# Patient Record
Sex: FEMALE | Race: WHITE | NOT HISPANIC OR LATINO | Employment: FULL TIME | URBAN - METROPOLITAN AREA
[De-identification: names, ages, dates, MRNs, and addresses within clinical notes are randomized per-mention and may not be internally consistent; named-entity substitution may affect disease eponyms.]

---
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
## 2022-01-02 ENCOUNTER — LAB VISIT (OUTPATIENT)
Dept: LAB | Facility: HOSPITAL | Age: 22
End: 2022-01-02
Attending: OBSTETRICS & GYNECOLOGY
Payer: MEDICAID

## 2022-01-02 DIAGNOSIS — Z31.69 INFERTILITY COUNSELING: ICD-10-CM

## 2022-01-02 LAB — PROGEST SERPL-MCNC: 1.9 NG/ML

## 2022-01-02 PROCEDURE — 36415 COLL VENOUS BLD VENIPUNCTURE: CPT | Performed by: OBSTETRICS & GYNECOLOGY

## 2022-01-02 PROCEDURE — 84144 ASSAY OF PROGESTERONE: CPT | Performed by: OBSTETRICS & GYNECOLOGY

## 2022-01-03 ENCOUNTER — PATIENT MESSAGE (OUTPATIENT)
Dept: OBSTETRICS AND GYNECOLOGY | Facility: CLINIC | Age: 22
End: 2022-01-03
Payer: COMMERCIAL

## 2022-01-03 DIAGNOSIS — Z31.9 INFERTILITY MANAGEMENT: Primary | ICD-10-CM

## 2022-01-04 ENCOUNTER — LAB VISIT (OUTPATIENT)
Dept: LAB | Facility: HOSPITAL | Age: 22
End: 2022-01-04
Attending: OBSTETRICS & GYNECOLOGY
Payer: COMMERCIAL

## 2022-01-04 ENCOUNTER — PATIENT MESSAGE (OUTPATIENT)
Dept: OBSTETRICS AND GYNECOLOGY | Facility: CLINIC | Age: 22
End: 2022-01-04
Payer: COMMERCIAL

## 2022-01-04 DIAGNOSIS — Z31.9 INFERTILITY MANAGEMENT: ICD-10-CM

## 2022-01-04 LAB — PROGEST SERPL-MCNC: 5 NG/ML

## 2022-01-04 PROCEDURE — 36415 COLL VENOUS BLD VENIPUNCTURE: CPT | Performed by: OBSTETRICS & GYNECOLOGY

## 2022-01-04 PROCEDURE — 84144 ASSAY OF PROGESTERONE: CPT | Performed by: OBSTETRICS & GYNECOLOGY

## 2022-01-08 ENCOUNTER — PATIENT MESSAGE (OUTPATIENT)
Dept: OBSTETRICS AND GYNECOLOGY | Facility: CLINIC | Age: 22
End: 2022-01-08
Payer: COMMERCIAL

## 2022-01-12 ENCOUNTER — PATIENT MESSAGE (OUTPATIENT)
Dept: OBSTETRICS AND GYNECOLOGY | Facility: CLINIC | Age: 22
End: 2022-01-12
Payer: COMMERCIAL

## 2022-01-14 ENCOUNTER — TELEPHONE (OUTPATIENT)
Dept: OBSTETRICS AND GYNECOLOGY | Facility: CLINIC | Age: 22
End: 2022-01-14
Payer: COMMERCIAL

## 2022-01-14 DIAGNOSIS — N91.2 AMENORRHEA: Primary | ICD-10-CM

## 2022-05-10 ENCOUNTER — OFFICE VISIT (OUTPATIENT)
Dept: OBSTETRICS AND GYNECOLOGY | Facility: CLINIC | Age: 22
End: 2022-05-10
Payer: MEDICAID

## 2022-05-10 VITALS
HEIGHT: 61 IN | BODY MASS INDEX: 48.43 KG/M2 | HEART RATE: 72 BPM | RESPIRATION RATE: 15 BRPM | SYSTOLIC BLOOD PRESSURE: 110 MMHG | WEIGHT: 256.5 LBS | DIASTOLIC BLOOD PRESSURE: 70 MMHG

## 2022-05-10 DIAGNOSIS — N91.2 AMENORRHEA: Primary | ICD-10-CM

## 2022-05-10 DIAGNOSIS — N97.0 ANOVULATORY CYCLE: ICD-10-CM

## 2022-05-10 DIAGNOSIS — Z01.812 PRE-PROCEDURE LAB EXAM: ICD-10-CM

## 2022-05-10 LAB
B-HCG UR QL: NEGATIVE
CTP QC/QA: YES

## 2022-05-10 PROCEDURE — 3008F BODY MASS INDEX DOCD: CPT | Mod: CPTII,,, | Performed by: OBSTETRICS & GYNECOLOGY

## 2022-05-10 PROCEDURE — 3074F SYST BP LT 130 MM HG: CPT | Mod: CPTII,,, | Performed by: OBSTETRICS & GYNECOLOGY

## 2022-05-10 PROCEDURE — 3074F PR MOST RECENT SYSTOLIC BLOOD PRESSURE < 130 MM HG: ICD-10-PCS | Mod: CPTII,,, | Performed by: OBSTETRICS & GYNECOLOGY

## 2022-05-10 PROCEDURE — 99999 PR PBB SHADOW E&M-EST. PATIENT-LVL III: ICD-10-PCS | Mod: PBBFAC,,, | Performed by: OBSTETRICS & GYNECOLOGY

## 2022-05-10 PROCEDURE — 81025 URINE PREGNANCY TEST: CPT | Mod: PBBFAC | Performed by: OBSTETRICS & GYNECOLOGY

## 2022-05-10 PROCEDURE — 1159F PR MEDICATION LIST DOCUMENTED IN MEDICAL RECORD: ICD-10-PCS | Mod: CPTII,,, | Performed by: OBSTETRICS & GYNECOLOGY

## 2022-05-10 PROCEDURE — 3078F DIAST BP <80 MM HG: CPT | Mod: CPTII,,, | Performed by: OBSTETRICS & GYNECOLOGY

## 2022-05-10 PROCEDURE — 99213 PR OFFICE/OUTPT VISIT, EST, LEVL III, 20-29 MIN: ICD-10-PCS | Mod: S$PBB,,, | Performed by: OBSTETRICS & GYNECOLOGY

## 2022-05-10 PROCEDURE — 99213 OFFICE O/P EST LOW 20 MIN: CPT | Mod: S$PBB,,, | Performed by: OBSTETRICS & GYNECOLOGY

## 2022-05-10 PROCEDURE — 99213 OFFICE O/P EST LOW 20 MIN: CPT | Mod: PBBFAC | Performed by: OBSTETRICS & GYNECOLOGY

## 2022-05-10 PROCEDURE — 3078F PR MOST RECENT DIASTOLIC BLOOD PRESSURE < 80 MM HG: ICD-10-PCS | Mod: CPTII,,, | Performed by: OBSTETRICS & GYNECOLOGY

## 2022-05-10 PROCEDURE — 99999 PR PBB SHADOW E&M-EST. PATIENT-LVL III: CPT | Mod: PBBFAC,,, | Performed by: OBSTETRICS & GYNECOLOGY

## 2022-05-10 PROCEDURE — 3008F PR BODY MASS INDEX (BMI) DOCUMENTED: ICD-10-PCS | Mod: CPTII,,, | Performed by: OBSTETRICS & GYNECOLOGY

## 2022-05-10 PROCEDURE — 1159F MED LIST DOCD IN RCRD: CPT | Mod: CPTII,,, | Performed by: OBSTETRICS & GYNECOLOGY

## 2022-05-10 RX ORDER — MEDROXYPROGESTERONE ACETATE 10 MG/1
10 TABLET ORAL DAILY
Qty: 10 TABLET | Refills: 3 | Status: SHIPPED | OUTPATIENT
Start: 2022-05-10 | End: 2022-12-19

## 2022-05-10 NOTE — PROGRESS NOTES
Subjective:    Patient ID: Janett Urias is a 21 y.o. y.o. female.     Chief Complaint:   Chief Complaint   Patient presents with    Fertility       History of Present Illness:  Janett presents today to discuss fertility. She has had multiple progesterones done in the past which have not demonstrated good ovulation. She is currently a few days late on her cycle with negative UPTs. She has seen a fertility specialist who instructed her to lose weight.     Patient had an HSG in 2020 which was normal but has had chlamydia since then.         ROS:   Review of Systems   Constitutional: Negative for activity change, appetite change, chills, diaphoresis, fatigue, fever and unexpected weight change.   HENT: Negative for mouth sores and tinnitus.    Eyes: Negative for discharge and visual disturbance.   Respiratory: Negative for cough, shortness of breath and wheezing.    Cardiovascular: Negative for chest pain, palpitations and leg swelling.   Gastrointestinal: Negative for abdominal pain, bloating, blood in stool, constipation, diarrhea, nausea and vomiting.   Endocrine: Negative for diabetes, hair loss, hot flashes, hyperthyroidism and hypothyroidism.   Genitourinary: Positive for menstrual problem. Negative for dysuria, flank pain, frequency, genital sores, hematuria, urgency, vaginal bleeding, vaginal discharge, vaginal pain, postcoital bleeding and vaginal odor.   Musculoskeletal: Negative for back pain, joint swelling and myalgias.   Integumentary:  Negative for rash, acne, breast mass, nipple discharge and breast skin changes.   Neurological: Negative for seizures, syncope, numbness and headaches.   Hematological: Negative for adenopathy. Does not bruise/bleed easily.   Psychiatric/Behavioral: Negative for depression and sleep disturbance. The patient is not nervous/anxious.    Breast: Negative for mass, mastodynia, nipple discharge and skin changes          Objective:    Vital Signs:  Vitals:    05/10/22 1624    BP: 110/70   Pulse: 72   Resp: 15       Physical Exam:  General:  alert, cooperative, no distress   Head Normocephalic, without obvious abnormality, atraumatic   Neck .supple, symmetrical, trachea midline   Skin:  Skin color, texture, turgor normal. No rashes or lesions   Abdomen:  soft, non-tender; bowel sounds normal   Pelvis: deferred   Extremities extremities normal, atraumatic, no cyanosis or edema   Neurologic Grossly normal      UPT negative    Assessment:      1. Amenorrhea    2. Anovulatory cycle          Plan:      PLAN:   1. Rx of provera  2. Day 21 progesterone, hemoglobin A1c, TSH, Prolactin  3. Repeat HSG   4. Will need Rx of Letrzole for next month

## 2022-05-11 ENCOUNTER — TELEPHONE (OUTPATIENT)
Dept: OBSTETRICS AND GYNECOLOGY | Facility: CLINIC | Age: 22
End: 2022-05-11
Payer: MEDICAID

## 2022-05-11 NOTE — TELEPHONE ENCOUNTER
Pt instructed to contact office on cycle day one of menses, if that happens on the weekend call on Monday.  Pt verbalized understanding.

## 2022-05-11 NOTE — TELEPHONE ENCOUNTER
----- Message from Svetlana Muse MD sent at 5/10/2022  4:39 PM CDT -----  Patient would like HSG scheduled.     Thanks

## 2022-06-01 ENCOUNTER — TELEPHONE (OUTPATIENT)
Dept: OBSTETRICS AND GYNECOLOGY | Facility: CLINIC | Age: 22
End: 2022-06-01
Payer: MEDICAID

## 2022-06-01 DIAGNOSIS — N97.9 INFERTILITY, FEMALE: Primary | ICD-10-CM

## 2022-06-01 DIAGNOSIS — Z01.812 PRE-PROCEDURE LAB EXAM: ICD-10-CM

## 2022-06-01 NOTE — TELEPHONE ENCOUNTER
Menses began on 6/1/2022.  Patient scheduled for HSG with stat urine pregnancy test on 6/9/22.  Instructed to arrive at [facility] main entry at 7:30a.m. registering at the information desk and to be NPO two hours prior to testing.  May take two Ibuprofen or Tylenol one hour prior to procedure if no allergies.  Patient verbalized understanding with no further questions asked.

## 2022-06-01 NOTE — TELEPHONE ENCOUNTER
Called pt and she is cycle day 1 today. Pt desires HSG discussed last visit.. Please place order. Thank you.

## 2022-06-01 NOTE — TELEPHONE ENCOUNTER
Pt called and is cycle day 1 today. Pt desires to schedule HSG. Message sent to Dr. Alejandro to order and pt aware she will receive a call to get that scheduled.

## 2022-06-01 NOTE — TELEPHONE ENCOUNTER
----- Message from Lisa Culp sent at 2022  1:35 PM CDT -----  Contact: self  Janett Urias  MRN: [id number]  : 2000  PCP: Chester Phillips  Home Phone      [phone]  Work Phone      Not on file.  Mobile          [phone]      MESSAGE: Patient stated that she had to call the first day of cycle for a HSG       Phone [phone]

## 2022-08-29 ENCOUNTER — OFFICE VISIT (OUTPATIENT)
Dept: OBSTETRICS AND GYNECOLOGY | Facility: CLINIC | Age: 22
End: 2022-08-29
Attending: OBSTETRICS & GYNECOLOGY
Payer: MEDICAID

## 2022-08-29 ENCOUNTER — PATIENT MESSAGE (OUTPATIENT)
Dept: OBSTETRICS AND GYNECOLOGY | Facility: CLINIC | Age: 22
End: 2022-08-29
Payer: MEDICAID

## 2022-08-29 VITALS
HEIGHT: 61 IN | DIASTOLIC BLOOD PRESSURE: 84 MMHG | HEART RATE: 98 BPM | BODY MASS INDEX: 46.82 KG/M2 | SYSTOLIC BLOOD PRESSURE: 120 MMHG | RESPIRATION RATE: 14 BRPM | WEIGHT: 248 LBS

## 2022-08-29 DIAGNOSIS — N94.9 GENITAL LESION, FEMALE: ICD-10-CM

## 2022-08-29 DIAGNOSIS — Z11.3 SCREEN FOR STD (SEXUALLY TRANSMITTED DISEASE): Primary | ICD-10-CM

## 2022-08-29 PROCEDURE — 87491 CHLMYD TRACH DNA AMP PROBE: CPT | Performed by: OBSTETRICS & GYNECOLOGY

## 2022-08-29 PROCEDURE — 1160F PR REVIEW ALL MEDS BY PRESCRIBER/CLIN PHARMACIST DOCUMENTED: ICD-10-PCS | Mod: CPTII,,, | Performed by: OBSTETRICS & GYNECOLOGY

## 2022-08-29 PROCEDURE — 87529 HSV DNA AMP PROBE: CPT | Performed by: OBSTETRICS & GYNECOLOGY

## 2022-08-29 PROCEDURE — 3008F BODY MASS INDEX DOCD: CPT | Mod: CPTII,,, | Performed by: OBSTETRICS & GYNECOLOGY

## 2022-08-29 PROCEDURE — 1159F PR MEDICATION LIST DOCUMENTED IN MEDICAL RECORD: ICD-10-PCS | Mod: CPTII,,, | Performed by: OBSTETRICS & GYNECOLOGY

## 2022-08-29 PROCEDURE — 3074F SYST BP LT 130 MM HG: CPT | Mod: CPTII,,, | Performed by: OBSTETRICS & GYNECOLOGY

## 2022-08-29 PROCEDURE — 1160F RVW MEDS BY RX/DR IN RCRD: CPT | Mod: CPTII,,, | Performed by: OBSTETRICS & GYNECOLOGY

## 2022-08-29 PROCEDURE — 3079F DIAST BP 80-89 MM HG: CPT | Mod: CPTII,,, | Performed by: OBSTETRICS & GYNECOLOGY

## 2022-08-29 PROCEDURE — 99999 PR PBB SHADOW E&M-EST. PATIENT-LVL III: ICD-10-PCS | Mod: PBBFAC,,, | Performed by: OBSTETRICS & GYNECOLOGY

## 2022-08-29 PROCEDURE — 99213 PR OFFICE/OUTPT VISIT, EST, LEVL III, 20-29 MIN: ICD-10-PCS | Mod: S$PBB,,, | Performed by: OBSTETRICS & GYNECOLOGY

## 2022-08-29 PROCEDURE — 3008F PR BODY MASS INDEX (BMI) DOCUMENTED: ICD-10-PCS | Mod: CPTII,,, | Performed by: OBSTETRICS & GYNECOLOGY

## 2022-08-29 PROCEDURE — 3074F PR MOST RECENT SYSTOLIC BLOOD PRESSURE < 130 MM HG: ICD-10-PCS | Mod: CPTII,,, | Performed by: OBSTETRICS & GYNECOLOGY

## 2022-08-29 PROCEDURE — 99213 OFFICE O/P EST LOW 20 MIN: CPT | Mod: PBBFAC | Performed by: OBSTETRICS & GYNECOLOGY

## 2022-08-29 PROCEDURE — 99999 PR PBB SHADOW E&M-EST. PATIENT-LVL III: CPT | Mod: PBBFAC,,, | Performed by: OBSTETRICS & GYNECOLOGY

## 2022-08-29 PROCEDURE — 99213 OFFICE O/P EST LOW 20 MIN: CPT | Mod: S$PBB,,, | Performed by: OBSTETRICS & GYNECOLOGY

## 2022-08-29 PROCEDURE — 87591 N.GONORRHOEAE DNA AMP PROB: CPT | Performed by: OBSTETRICS & GYNECOLOGY

## 2022-08-29 PROCEDURE — 1159F MED LIST DOCD IN RCRD: CPT | Mod: CPTII,,, | Performed by: OBSTETRICS & GYNECOLOGY

## 2022-08-29 PROCEDURE — 3079F PR MOST RECENT DIASTOLIC BLOOD PRESSURE 80-89 MM HG: ICD-10-PCS | Mod: CPTII,,, | Performed by: OBSTETRICS & GYNECOLOGY

## 2022-08-29 RX ORDER — LIDOCAINE HYDROCHLORIDE 20 MG/ML
JELLY TOPICAL
Qty: 30 ML | Refills: 1 | Status: SHIPPED | OUTPATIENT
Start: 2022-08-29 | End: 2022-12-19

## 2022-08-29 RX ORDER — VALACYCLOVIR HYDROCHLORIDE 1 G/1
1000 TABLET, FILM COATED ORAL EVERY 12 HOURS
Qty: 20 TABLET | Refills: 1 | Status: SHIPPED | OUTPATIENT
Start: 2022-08-29 | End: 2022-09-08

## 2022-08-29 NOTE — PROGRESS NOTES
Subjective:    Patient ID: Janett Urias is a 21 y.o. y.o. female.     Chief Complaint:   Chief Complaint   Patient presents with    Laceration     Pt states she cut herself shaving    STD CHECK       History of Present Illness   Janett presents today complaining of burning and local irritation. Symptoms began 4 days and have been persistent.  Patient's last menstrual period was 08/26/2022. She is sexually active.  She would also like to have GC/CT testing.     ROS:   CONSTITUTIONAL: Negative for fever, chills, diaphoresis, weakness, fatigue, weight loss, weight gain  GASTROINTESTINAL: negative for abdominal pain, flank pain, nausea, vomiting, diarrhea, constipation, black stool, blood in stool  BREAST: negative for breast  tenderness, breast mass, nipple discharge, or skin changes  GENITOURINARY: vulvar pain per HPI, negative for dysuria, frequency/urgency, hematuria, genital discharge, vaginal bleeding, irregular menses, heavy menses, pelvic pain  HEMATOLOGIC/LYMPHATIC: negative for swollen lymph nodes, bleeding, bruising  MUSCULOSKELETAL: negative for back pain, joint pain, joint stiffness, joint swelling, muscle pain, muscle weakness  ENDOCRINE: negative for polydipsia/polyuria, palpitations, skin changes, temperature intolerance, unexpected weight changes      Objective:    Vital Signs:  Vitals:    08/29/22 1436   BP: 120/84   Pulse: 98   Resp: 14       Physical Exam:  General:  alert, cooperative, no distress   Neuro/Psych: AAOx3, appropriate mood and affect   Head: Normocephalic, atraumatic   Neck: Supple, normal ROM   Resp: Normal effort   Skin:  Skin color, texture, turgor normal. No rashes or lesions   Pelvis: External genitalia: normal general appearance, erosions, c/w primary herpes lesion (HSV culture obtained)  Urinary system: urethral meatus normal, bladder nontender  Vaginal: normal mucosa without prolapse or lesions  Cervix: normal appearance  Uterus: normal size, shape, position  Adnexa: normal  size, nontender bilaterally         Assessment:      1. Screen for STD (sexually transmitted disease)    2. Genital lesion, female          Plan:      Screen for STD (sexually transmitted disease)  -     C. trachomatis/N. gonorrhoeae by AMP DNA [facility]; Cervix  -     HSV by Rapid PCR, Non-Blood [facility]; Vagina; Future; Expected date: 08/29/2022  -     valACYclovir (VALTREX) 1000 MG tablet; Take 1 tablet (1,000 mg total) by mouth every 12 (twelve) hours. for 10 days  Dispense: 20 tablet; Refill: 1  -     LIDOcaine HCL 2% (XYLOCAINE) 2 % jelly; Apply to affected area daily prn  Dispense: 30 mL; Refill: 1    Genital lesion, female  -     HSV by Rapid PCR, Non-Blood [facility]; Vagina; Future; Expected date: 08/29/2022  -     valACYclovir (VALTREX) 1000 MG tablet; Take 1 tablet (1,000 mg total) by mouth every 12 (twelve) hours. for 10 days  Dispense: 20 tablet; Refill: 1  -     LIDOcaine HCL 2% (XYLOCAINE) 2 % jelly; Apply to affected area daily prn  Dispense: 30 mL; Refill: 1

## 2022-08-30 LAB
C TRACH DNA SPEC QL NAA+PROBE: DETECTED
N GONORRHOEA DNA SPEC QL NAA+PROBE: NOT DETECTED

## 2022-08-31 ENCOUNTER — TELEPHONE (OUTPATIENT)
Dept: OBSTETRICS AND GYNECOLOGY | Facility: CLINIC | Age: 22
End: 2022-08-31
Payer: MEDICAID

## 2022-08-31 ENCOUNTER — PATIENT MESSAGE (OUTPATIENT)
Dept: OBSTETRICS AND GYNECOLOGY | Facility: CLINIC | Age: 22
End: 2022-08-31
Payer: MEDICAID

## 2022-08-31 RX ORDER — AZITHROMYCIN 500 MG/1
1000 TABLET, FILM COATED ORAL ONCE
Qty: 2 TABLET | Refills: 0 | Status: SHIPPED | OUTPATIENT
Start: 2022-08-31 | End: 2022-08-31

## 2022-08-31 NOTE — TELEPHONE ENCOUNTER
Pt was called with partner information. Informed pt unsure if patient info is in Epic or not. Instructed pt to have partner call back with his information to confirm. Pt voiced understanding.

## 2022-08-31 NOTE — TELEPHONE ENCOUNTER
----- Message from Lisa Culp sent at 2022  2:41 PM CDT -----  Contact: self  Janett Urias  MRN: [id number]  : 2000  PCP: Chester Phillips  Home Phone      [phone]  Work Phone      Not on file.  Mobile          [phone]      MESSAGE: Calling stating she was told to call the clinic to give her partner information.      Phone  [phone]

## 2022-09-02 LAB
HSV1 DNA SPEC QL NAA+PROBE: NEGATIVE
HSV2 DNA SPEC QL NAA+PROBE: POSITIVE
SPECIMEN SOURCE: ABNORMAL

## 2022-09-11 ENCOUNTER — PATIENT MESSAGE (OUTPATIENT)
Dept: OBSTETRICS AND GYNECOLOGY | Facility: CLINIC | Age: 22
End: 2022-09-11
Payer: MEDICAID

## 2022-09-12 RX ORDER — VALACYCLOVIR HYDROCHLORIDE 1 G/1
1000 TABLET, FILM COATED ORAL DAILY
Qty: 30 TABLET | Refills: 0 | Status: SHIPPED | OUTPATIENT
Start: 2022-09-12 | End: 2022-09-30

## 2022-12-14 ENCOUNTER — TELEPHONE (OUTPATIENT)
Dept: OBSTETRICS AND GYNECOLOGY | Facility: CLINIC | Age: 22
End: 2022-12-14
Payer: MEDICAID

## 2022-12-14 NOTE — TELEPHONE ENCOUNTER
Called pt and she has appt in 2 weeks to repeat chlamydia culture due to wanting to make sure it has resolved. Pt denied s/s. Pt desires a sooner appt with any provider. Appt given with Dr. Hernández for tomorrow @ 12. Pt voiced understanding.

## 2022-12-14 NOTE — TELEPHONE ENCOUNTER
----- Message from Karla De Leon sent at 12/14/2022 11:14 AM CST -----  Contact: self  Janett Urias  MRN: [id number]  Home Phone      [phone]  Work Phone      Not on file.  Mobile          [phone]    Patient Care Team:  TRISTA Mckeon as PCP - General (General Practice)  Cheryl Flores MD as Consulting Physician (Obstetrics and Gynecology)  Salazar Hernández MD as Obstetrician (Obstetrics)  OB? No  What phone number can you be reached at? [phone]  Message: patient is wanting to be scheduled for STD screening

## 2022-12-15 ENCOUNTER — OFFICE VISIT (OUTPATIENT)
Dept: OBSTETRICS AND GYNECOLOGY | Facility: CLINIC | Age: 22
End: 2022-12-15
Payer: MEDICAID

## 2022-12-15 ENCOUNTER — LAB VISIT (OUTPATIENT)
Dept: LAB | Facility: HOSPITAL | Age: 22
End: 2022-12-15
Attending: OBSTETRICS & GYNECOLOGY
Payer: MEDICAID

## 2022-12-15 VITALS
WEIGHT: 254 LBS | DIASTOLIC BLOOD PRESSURE: 68 MMHG | HEART RATE: 76 BPM | BODY MASS INDEX: 47.95 KG/M2 | HEIGHT: 61 IN | SYSTOLIC BLOOD PRESSURE: 122 MMHG

## 2022-12-15 DIAGNOSIS — Z11.3 SCREENING EXAMINATION FOR STD (SEXUALLY TRANSMITTED DISEASE): ICD-10-CM

## 2022-12-15 DIAGNOSIS — Z11.3 SCREENING EXAMINATION FOR STD (SEXUALLY TRANSMITTED DISEASE): Primary | ICD-10-CM

## 2022-12-15 PROCEDURE — 99999 PR PBB SHADOW E&M-EST. PATIENT-LVL III: ICD-10-PCS | Mod: PBBFAC,,, | Performed by: OBSTETRICS & GYNECOLOGY

## 2022-12-15 PROCEDURE — 3074F SYST BP LT 130 MM HG: CPT | Mod: CPTII,,, | Performed by: OBSTETRICS & GYNECOLOGY

## 2022-12-15 PROCEDURE — 99213 OFFICE O/P EST LOW 20 MIN: CPT | Mod: PBBFAC | Performed by: OBSTETRICS & GYNECOLOGY

## 2022-12-15 PROCEDURE — 87389 HIV-1 AG W/HIV-1&-2 AB AG IA: CPT | Performed by: OBSTETRICS & GYNECOLOGY

## 2022-12-15 PROCEDURE — 99213 PR OFFICE/OUTPT VISIT, EST, LEVL III, 20-29 MIN: ICD-10-PCS | Mod: S$PBB,,, | Performed by: OBSTETRICS & GYNECOLOGY

## 2022-12-15 PROCEDURE — 36415 COLL VENOUS BLD VENIPUNCTURE: CPT | Performed by: OBSTETRICS & GYNECOLOGY

## 2022-12-15 PROCEDURE — 86592 SYPHILIS TEST NON-TREP QUAL: CPT | Performed by: OBSTETRICS & GYNECOLOGY

## 2022-12-15 PROCEDURE — 3008F PR BODY MASS INDEX (BMI) DOCUMENTED: ICD-10-PCS | Mod: CPTII,,, | Performed by: OBSTETRICS & GYNECOLOGY

## 2022-12-15 PROCEDURE — 1159F MED LIST DOCD IN RCRD: CPT | Mod: CPTII,,, | Performed by: OBSTETRICS & GYNECOLOGY

## 2022-12-15 PROCEDURE — 87591 N.GONORRHOEAE DNA AMP PROB: CPT | Performed by: OBSTETRICS & GYNECOLOGY

## 2022-12-15 PROCEDURE — 3078F DIAST BP <80 MM HG: CPT | Mod: CPTII,,, | Performed by: OBSTETRICS & GYNECOLOGY

## 2022-12-15 PROCEDURE — 3078F PR MOST RECENT DIASTOLIC BLOOD PRESSURE < 80 MM HG: ICD-10-PCS | Mod: CPTII,,, | Performed by: OBSTETRICS & GYNECOLOGY

## 2022-12-15 PROCEDURE — 1160F RVW MEDS BY RX/DR IN RCRD: CPT | Mod: CPTII,,, | Performed by: OBSTETRICS & GYNECOLOGY

## 2022-12-15 PROCEDURE — 1159F PR MEDICATION LIST DOCUMENTED IN MEDICAL RECORD: ICD-10-PCS | Mod: CPTII,,, | Performed by: OBSTETRICS & GYNECOLOGY

## 2022-12-15 PROCEDURE — 1160F PR REVIEW ALL MEDS BY PRESCRIBER/CLIN PHARMACIST DOCUMENTED: ICD-10-PCS | Mod: CPTII,,, | Performed by: OBSTETRICS & GYNECOLOGY

## 2022-12-15 PROCEDURE — 99213 OFFICE O/P EST LOW 20 MIN: CPT | Mod: S$PBB,,, | Performed by: OBSTETRICS & GYNECOLOGY

## 2022-12-15 PROCEDURE — 3074F PR MOST RECENT SYSTOLIC BLOOD PRESSURE < 130 MM HG: ICD-10-PCS | Mod: CPTII,,, | Performed by: OBSTETRICS & GYNECOLOGY

## 2022-12-15 PROCEDURE — 87340 HEPATITIS B SURFACE AG IA: CPT | Performed by: OBSTETRICS & GYNECOLOGY

## 2022-12-15 PROCEDURE — 87491 CHLMYD TRACH DNA AMP PROBE: CPT | Performed by: OBSTETRICS & GYNECOLOGY

## 2022-12-15 PROCEDURE — 99999 PR PBB SHADOW E&M-EST. PATIENT-LVL III: CPT | Mod: PBBFAC,,, | Performed by: OBSTETRICS & GYNECOLOGY

## 2022-12-15 PROCEDURE — 3008F BODY MASS INDEX DOCD: CPT | Mod: CPTII,,, | Performed by: OBSTETRICS & GYNECOLOGY

## 2022-12-15 NOTE — PROGRESS NOTES
Subjective:       Patient ID: Janett Urias is a 22 y.o. female.    Chief Complaint:  repeat culture (Pt here to repeat gc )      History of Present Illness  Patient presents after being treated for chlamydia infection.  Patient reports taking medication as directed.  Patient is also interested in STD screening blood work.  Patient knows that she is positive for herpes.  Patient reports she is been trying to conceive for a while now.    Menstrual History:  OB History          1    Para        Term                AB   1    Living             SAB   1    IAB        Ectopic        Multiple        Live Births                    Menarche age:  Patient's last menstrual period was 2022.         Review of Systems  Review of Systems   Constitutional:  Negative for activity change, appetite change, chills, diaphoresis, fatigue, fever and unexpected weight change.   HENT:  Negative for congestion, dental problem, drooling, ear discharge, ear pain, facial swelling, hearing loss, mouth sores, nosebleeds, postnasal drip, rhinorrhea, sinus pressure, sneezing, sore throat, tinnitus, trouble swallowing and voice change.    Eyes:  Negative for photophobia, pain, discharge, redness, itching and visual disturbance.   Respiratory:  Negative for apnea, cough, choking, chest tightness, shortness of breath, wheezing and stridor.    Cardiovascular:  Negative for chest pain, palpitations and leg swelling.   Gastrointestinal:  Negative for abdominal distention, abdominal pain, anal bleeding, blood in stool, constipation, diarrhea, nausea, rectal pain and vomiting.   Endocrine: Negative for cold intolerance, heat intolerance, polydipsia, polyphagia and polyuria.   Genitourinary:  Negative for decreased urine volume, difficulty urinating, dyspareunia, dysuria, enuresis, flank pain, frequency, genital sores, hematuria, menstrual problem, pelvic pain, urgency, vaginal bleeding, vaginal discharge and vaginal pain.    Musculoskeletal:  Negative for arthralgias, back pain, gait problem, joint swelling, myalgias, neck pain and neck stiffness.   Skin:  Negative for color change, pallor, rash and wound.   Allergic/Immunologic: Negative for environmental allergies, food allergies and immunocompromised state.   Neurological:  Negative for dizziness, tremors, seizures, syncope, facial asymmetry, speech difficulty, weakness, light-headedness, numbness and headaches.   Hematological:  Negative for adenopathy. Does not bruise/bleed easily.   Psychiatric/Behavioral:  Negative for agitation, behavioral problems, confusion, decreased concentration, dysphoric mood, hallucinations, self-injury, sleep disturbance and suicidal ideas. The patient is not nervous/anxious and is not hyperactive.          Objective:      Physical Exam  Vitals and nursing note reviewed. Exam conducted with a chaperone present.   Abdominal:      Hernia: There is no hernia in the left inguinal area or right inguinal area.   Genitourinary:     General: Normal vulva.      Labia:         Right: No rash, tenderness, lesion or injury.         Left: No rash, tenderness, lesion or injury.       Urethra: No prolapse, urethral pain, urethral swelling or urethral lesion.      Vagina: No signs of injury and foreign body. No vaginal discharge, erythema, tenderness or bleeding.      Cervix: No cervical motion tenderness, discharge or friability.      Uterus: Not deviated, not enlarged, not fixed and not tender.       Adnexa:         Right: No mass, tenderness or fullness.          Left: No mass, tenderness or fullness.        Rectum: No external hemorrhoid.   Lymphadenopathy:      Lower Body: No right inguinal adenopathy. No left inguinal adenopathy.         Assessment:        1. Screening examination for STD (sexually transmitted disease)                Plan:         Janett was seen today for repeat culture.    Diagnoses and all orders for this visit:    Screening examination for  STD (sexually transmitted disease)  -     C. trachomatis/N. gonorrhoeae by AMP DNA [facility]; Cervicovaginal  -     Hepatitis B Surface Antigen; Future  -     HIV 1/2 Ag/Ab (4th Gen); Future  -     RPR; Future

## 2022-12-16 LAB
C TRACH DNA SPEC QL NAA+PROBE: NOT DETECTED
N GONORRHOEA DNA SPEC QL NAA+PROBE: NOT DETECTED
RPR SER QL: NORMAL

## 2022-12-17 ENCOUNTER — PATIENT MESSAGE (OUTPATIENT)
Dept: OBSTETRICS AND GYNECOLOGY | Facility: CLINIC | Age: 22
End: 2022-12-17
Payer: MEDICAID

## 2022-12-17 LAB
HBV SURFACE AG SERPL QL IA: NORMAL
HIV 1+2 AB+HIV1 P24 AG SERPL QL IA: NORMAL

## 2022-12-19 ENCOUNTER — PATIENT MESSAGE (OUTPATIENT)
Dept: OBSTETRICS AND GYNECOLOGY | Facility: CLINIC | Age: 22
End: 2022-12-19
Payer: MEDICAID

## 2022-12-19 ENCOUNTER — TELEPHONE (OUTPATIENT)
Dept: OBSTETRICS AND GYNECOLOGY | Facility: CLINIC | Age: 22
End: 2022-12-19
Payer: MEDICAID

## 2022-12-19 DIAGNOSIS — Z31.9 INFERTILITY MANAGEMENT: Primary | ICD-10-CM

## 2022-12-19 RX ORDER — VALACYCLOVIR HYDROCHLORIDE 1 G/1
1000 TABLET, FILM COATED ORAL DAILY
Qty: 30 TABLET | Refills: 1 | Status: SHIPPED | OUTPATIENT
Start: 2022-12-19 | End: 2023-04-10 | Stop reason: SDUPTHER

## 2022-12-19 RX ORDER — CLOMIPHENE CITRATE 50 MG/1
50 TABLET ORAL DAILY
Qty: 5 TABLET | Refills: 0 | Status: SHIPPED | OUTPATIENT
Start: 2022-12-19 | End: 2022-12-24

## 2022-12-19 RX ORDER — MEDROXYPROGESTERONE ACETATE 10 MG/1
10 TABLET ORAL DAILY
Qty: 10 TABLET | Refills: 3 | OUTPATIENT
Start: 2022-12-19 | End: 2022-12-29

## 2022-12-19 NOTE — TELEPHONE ENCOUNTER
----- Message from Karla De Leon sent at 12/19/2022 10:19 AM CST -----  Contact: self  Janett Urias  MRN: [id number]  Home Phone      [phone]  Work Phone      Not on file.  Mobile          [phone]    Patient Care Team:  TRISTA Mckeon as PCP - General (General Practice)  Cheryl Flores MD as Consulting Physician (Obstetrics and Gynecology)  Salazar Hernández MD as Obstetrician (Obstetrics)  OB? No  What phone number can you be reached at? [phone]  Message: patient is wanting to reschedule US and OB on 01/11

## 2022-12-19 NOTE — TELEPHONE ENCOUNTER
Clomid prescription sent to her pharmacy.  She will start this medicine on the 3rd day of her period.  Take daily for 5 days.     I will then want her to have a blood test done on day 21.

## 2022-12-19 NOTE — TELEPHONE ENCOUNTER
----- Message from Karla De Leon sent at 12/19/2022  1:45 PM CST -----  Contact: self  Janett Urias  MRN: [id number]  Home Phone      [phone]  Work Phone      Not on file.  Mobile          [phone]    Patient Care Team:  TRISTA Mckeon as PCP - General (General Practice)  Cheryl Flores MD as Consulting Physician (Obstetrics and Gynecology)  Salazar Hernández MD as Obstetrician (Obstetrics)  OB? No  What phone number can you be reached at? [phone]  Message: patient spoke with  Through the portal and have a few more questions.

## 2022-12-19 NOTE — TELEPHONE ENCOUNTER
Pt states she saw Dr. Hernández last week due to not being able to get in with Dr. Flores. States he discussed with her that if all her test came back nl he would prescribe her Clomid. Pt would like to know if since Dr. Flores is her doctor if she can just be prescribed Clomid by her or if she needs to be seen. Pt would like rx sent to [facility] in [place] if approved. Please advise.

## 2022-12-19 NOTE — TELEPHONE ENCOUNTER
Janett desire refill of Valtrex  Patient Active Problem List   Diagnosis    Chlamydia infection affecting pregnancy    Vaginal bleeding in pregnancy    Spontaneous     Reactive depression    Infertility, female     Prior to Admission medications    Medication Sig Start Date End Date Taking? Authorizing Provider   LIDOcaine HCL 2% (XYLOCAINE) 2 % jelly Apply to affected area daily prn  Patient not taking: Reported on 12/15/2022 8/29/22 8/29/23  Cheryl Flores MD   medroxyPROGESTERone (PROVERA) 10 MG tablet Take 1 tablet (10 mg total) by mouth once daily. for 10 days  Patient not taking: Reported on 2022 5/10/22 5/20/22  Svetlana Muse MD   valACYclovir (VALTREX) 1000 MG tablet TAKE 1 TABLET (1,000 MG TOTAL) BY MOUTH ONCE DAILY. FOR 1 DAY 9/30/22 10/1/22  Cheryl Flores MD

## 2022-12-19 NOTE — TELEPHONE ENCOUNTER
----- Message from Karla De Leon sent at 12/19/2022 10:15 AM CST -----  Contact: self  Janett Urias  MRN: [id number]  Home Phone      [phone]  Work Phone      Not on file.  Mobile          [phone]    Patient Care Team:  TRISTA Mckeon as PCP - General (General Practice)  Cheryl Flores MD as Consulting Physician (Obstetrics and Gynecology)  Salazar Hernández MD as Obstetrician (Obstetrics)  OB? No  What phone number can you be reached at? [phone]  Message: patient wants to follow up after STD screening was clear.

## 2022-12-19 NOTE — TELEPHONE ENCOUNTER
Pt called stating when she was seen for her visit if her std screens came back negative she would be able to begin progesterone and clomid regimen. Pt results are negative, please advise

## 2022-12-19 NOTE — TELEPHONE ENCOUNTER
Pt informed all of her test were negative, pt had messages between Dr. Hernández and Dr. Flores pod and it was becoming confusing so the pt decided to see Dr. Flores for the first round of clomid and progesterone labs and then she will see Dr. Hernández after.

## 2022-12-20 NOTE — TELEPHONE ENCOUNTER
Pt made aware of Rx and instructions.  States she did not start cycle yet.  Pt will contact clinic on CD1 to schedule for CD21 progesterone.  Understanding voiced.

## 2023-01-03 ENCOUNTER — LAB VISIT (OUTPATIENT)
Dept: LAB | Facility: HOSPITAL | Age: 23
End: 2023-01-03
Attending: OBSTETRICS & GYNECOLOGY
Payer: MEDICAID

## 2023-01-03 ENCOUNTER — TELEPHONE (OUTPATIENT)
Dept: OBSTETRICS AND GYNECOLOGY | Facility: CLINIC | Age: 23
End: 2023-01-03
Payer: MEDICAID

## 2023-01-03 DIAGNOSIS — Z34.90 EARLY STAGE OF PREGNANCY: Primary | ICD-10-CM

## 2023-01-03 DIAGNOSIS — Z32.01 POSITIVE URINE PREGNANCY TEST: ICD-10-CM

## 2023-01-03 DIAGNOSIS — Z32.01 POSITIVE URINE PREGNANCY TEST: Primary | ICD-10-CM

## 2023-01-03 LAB — HCG INTACT+B SERPL-ACNC: 76 MIU/ML

## 2023-01-03 PROCEDURE — 36415 COLL VENOUS BLD VENIPUNCTURE: CPT | Performed by: OBSTETRICS & GYNECOLOGY

## 2023-01-03 PROCEDURE — 84702 CHORIONIC GONADOTROPIN TEST: CPT | Performed by: OBSTETRICS & GYNECOLOGY

## 2023-01-03 NOTE — TELEPHONE ENCOUNTER
Pt was called and she had question about HCG being 76. Her LMP was 12/2/22 and would be approximately 4 1/2 weeks. Pt also desires to repeat HCG in 48 hours. Please advise. Thank you.

## 2023-01-03 NOTE — TELEPHONE ENCOUNTER
----- Message from Jerica Capone MA sent at 1/3/2023  8:09 AM CST -----  Contact: mauri Urias  MRN: [id number]  Home Phone      [phone]  Work Phone      Not on file.  Mobile          [phone]    Patient Care Team:  TRISTA Mckeon as PCP - General (General Practice)  Cheryl Flores MD as Consulting Physician (Obstetrics and Gynecology)  Salazar Hernández MD as Obstetrician (Obstetrics)  OB? Yes, Unknown  What phone number can you be reached at? [phone]  Message: Would like to make appt for dx preg and req'ing to see Dr Flores.

## 2023-01-03 NOTE — TELEPHONE ENCOUNTER
Pt was called and informed HCG was ordered and that she can go to the lab. Pt voiced understanding.

## 2023-01-03 NOTE — TELEPHONE ENCOUNTER
Pt was called and informed that HCG was ordered to and that level is great for her approx. EGA. Pt voiced understanding.

## 2023-01-03 NOTE — TELEPHONE ENCOUNTER
Pt was called and she desires appt to dx pregnancy. LMP was 12/2/22 and had positive UPT at home. Pt did not take clomid that was sent on 12/19/22. Appt made for dx pregnancy on 1/9/23 @ 2:45 with Dr. Flores. Pt desires HCG due to hx of SAB in 2019.

## 2023-01-03 NOTE — TELEPHONE ENCOUNTER
----- Message from Jerica Capone MA sent at 1/3/2023 12:28 PM CST -----  Contact: mauri Urias  MRN: [id number]  Home Phone      [phone]  Work Phone      Not on file.  Mobile          [phone]    Patient Care Team:  TRISTA Mckeon as PCP - General (General Practice)  Cheryl Flores MD as Consulting Physician (Obstetrics and Gynecology)  Salazar Hernández MD as Obstetrician (Obstetrics)  OB? yes  What phone number can you be reached at? [phone]  Message: Has questions regarding HGC results.

## 2023-01-05 ENCOUNTER — PATIENT MESSAGE (OUTPATIENT)
Dept: OBSTETRICS AND GYNECOLOGY | Facility: CLINIC | Age: 23
End: 2023-01-05
Payer: MEDICAID

## 2023-01-05 ENCOUNTER — LAB VISIT (OUTPATIENT)
Dept: LAB | Facility: HOSPITAL | Age: 23
End: 2023-01-05
Attending: OBSTETRICS & GYNECOLOGY
Payer: MEDICAID

## 2023-01-05 DIAGNOSIS — Z34.90 EARLY STAGE OF PREGNANCY: ICD-10-CM

## 2023-01-05 LAB — HCG INTACT+B SERPL-ACNC: 205 MIU/ML

## 2023-01-05 PROCEDURE — 36415 COLL VENOUS BLD VENIPUNCTURE: CPT | Performed by: OBSTETRICS & GYNECOLOGY

## 2023-01-05 PROCEDURE — 84702 CHORIONIC GONADOTROPIN TEST: CPT | Performed by: OBSTETRICS & GYNECOLOGY

## 2023-01-09 ENCOUNTER — LAB VISIT (OUTPATIENT)
Dept: LAB | Facility: HOSPITAL | Age: 23
End: 2023-01-09
Attending: OBSTETRICS & GYNECOLOGY
Payer: MEDICAID

## 2023-01-09 ENCOUNTER — OFFICE VISIT (OUTPATIENT)
Dept: OBSTETRICS AND GYNECOLOGY | Facility: CLINIC | Age: 23
End: 2023-01-09
Payer: MEDICAID

## 2023-01-09 VITALS
WEIGHT: 283.19 LBS | HEIGHT: 61 IN | BODY MASS INDEX: 53.47 KG/M2 | DIASTOLIC BLOOD PRESSURE: 76 MMHG | HEART RATE: 98 BPM | SYSTOLIC BLOOD PRESSURE: 126 MMHG | RESPIRATION RATE: 15 BRPM

## 2023-01-09 DIAGNOSIS — Z32.01 PREGNANCY EXAMINATION OR TEST, POSITIVE RESULT: ICD-10-CM

## 2023-01-09 DIAGNOSIS — Z32.01 PREGNANCY EXAMINATION OR TEST, POSITIVE RESULT: Primary | ICD-10-CM

## 2023-01-09 DIAGNOSIS — Z11.3 SCREENING FOR STD (SEXUALLY TRANSMITTED DISEASE): ICD-10-CM

## 2023-01-09 LAB
ABO + RH BLD: NORMAL
B-HCG UR QL: POSITIVE
BASOPHILS # BLD AUTO: 0.02 K/UL (ref 0–0.2)
BASOPHILS NFR BLD: 0.3 % (ref 0–1.9)
BLD GP AB SCN CELLS X3 SERPL QL: NORMAL
CTP QC/QA: YES
DIFFERENTIAL METHOD: ABNORMAL
EOSINOPHIL # BLD AUTO: 0 K/UL (ref 0–0.5)
EOSINOPHIL NFR BLD: 0.1 % (ref 0–8)
ERYTHROCYTE [DISTWIDTH] IN BLOOD BY AUTOMATED COUNT: 11.9 % (ref 11.5–14.5)
HCT VFR BLD AUTO: 37.7 % (ref 37–48.5)
HGB BLD-MCNC: 13.2 G/DL (ref 12–16)
IMM GRANULOCYTES # BLD AUTO: 0.03 K/UL (ref 0–0.04)
IMM GRANULOCYTES NFR BLD AUTO: 0.4 % (ref 0–0.5)
LYMPHOCYTES # BLD AUTO: 2.4 K/UL (ref 1–4.8)
LYMPHOCYTES NFR BLD: 33 % (ref 18–48)
MCH RBC QN AUTO: 32.6 PG (ref 27–31)
MCHC RBC AUTO-ENTMCNC: 35 G/DL (ref 32–36)
MCV RBC AUTO: 93 FL (ref 82–98)
MONOCYTES # BLD AUTO: 0.7 K/UL (ref 0.3–1)
MONOCYTES NFR BLD: 8.9 % (ref 4–15)
NEUTROPHILS # BLD AUTO: 4.2 K/UL (ref 1.8–7.7)
NEUTROPHILS NFR BLD: 57.3 % (ref 38–73)
NRBC BLD-RTO: 0 /100 WBC
PLATELET # BLD AUTO: 247 K/UL (ref 150–450)
PMV BLD AUTO: 10.3 FL (ref 9.2–12.9)
RBC # BLD AUTO: 4.05 M/UL (ref 4–5.4)
WBC # BLD AUTO: 7.28 K/UL (ref 3.9–12.7)

## 2023-01-09 PROCEDURE — 85025 COMPLETE CBC W/AUTO DIFF WBC: CPT | Performed by: OBSTETRICS & GYNECOLOGY

## 2023-01-09 PROCEDURE — 3008F PR BODY MASS INDEX (BMI) DOCUMENTED: ICD-10-PCS | Mod: CPTII,,, | Performed by: OBSTETRICS & GYNECOLOGY

## 2023-01-09 PROCEDURE — 3074F PR MOST RECENT SYSTOLIC BLOOD PRESSURE < 130 MM HG: ICD-10-PCS | Mod: CPTII,,, | Performed by: OBSTETRICS & GYNECOLOGY

## 2023-01-09 PROCEDURE — 1159F MED LIST DOCD IN RCRD: CPT | Mod: CPTII,,, | Performed by: OBSTETRICS & GYNECOLOGY

## 2023-01-09 PROCEDURE — 3008F BODY MASS INDEX DOCD: CPT | Mod: CPTII,,, | Performed by: OBSTETRICS & GYNECOLOGY

## 2023-01-09 PROCEDURE — 1160F PR REVIEW ALL MEDS BY PRESCRIBER/CLIN PHARMACIST DOCUMENTED: ICD-10-PCS | Mod: CPTII,,, | Performed by: OBSTETRICS & GYNECOLOGY

## 2023-01-09 PROCEDURE — 1159F PR MEDICATION LIST DOCUMENTED IN MEDICAL RECORD: ICD-10-PCS | Mod: CPTII,,, | Performed by: OBSTETRICS & GYNECOLOGY

## 2023-01-09 PROCEDURE — 1160F RVW MEDS BY RX/DR IN RCRD: CPT | Mod: CPTII,,, | Performed by: OBSTETRICS & GYNECOLOGY

## 2023-01-09 PROCEDURE — 86762 RUBELLA ANTIBODY: CPT | Performed by: OBSTETRICS & GYNECOLOGY

## 2023-01-09 PROCEDURE — 87077 CULTURE AEROBIC IDENTIFY: CPT | Performed by: OBSTETRICS & GYNECOLOGY

## 2023-01-09 PROCEDURE — 81025 URINE PREGNANCY TEST: CPT | Mod: PBBFAC | Performed by: OBSTETRICS & GYNECOLOGY

## 2023-01-09 PROCEDURE — 86803 HEPATITIS C AB TEST: CPT | Performed by: OBSTETRICS & GYNECOLOGY

## 2023-01-09 PROCEDURE — 87088 URINE BACTERIA CULTURE: CPT | Performed by: OBSTETRICS & GYNECOLOGY

## 2023-01-09 PROCEDURE — 87186 SC STD MICRODIL/AGAR DIL: CPT | Performed by: OBSTETRICS & GYNECOLOGY

## 2023-01-09 PROCEDURE — 3078F DIAST BP <80 MM HG: CPT | Mod: CPTII,,, | Performed by: OBSTETRICS & GYNECOLOGY

## 2023-01-09 PROCEDURE — 99999 PR PBB SHADOW E&M-EST. PATIENT-LVL III: CPT | Mod: PBBFAC,,, | Performed by: OBSTETRICS & GYNECOLOGY

## 2023-01-09 PROCEDURE — 87086 URINE CULTURE/COLONY COUNT: CPT | Performed by: OBSTETRICS & GYNECOLOGY

## 2023-01-09 PROCEDURE — 99213 OFFICE O/P EST LOW 20 MIN: CPT | Mod: PBBFAC,TH | Performed by: OBSTETRICS & GYNECOLOGY

## 2023-01-09 PROCEDURE — 99204 PR OFFICE/OUTPT VISIT, NEW, LEVL IV, 45-59 MIN: ICD-10-PCS | Mod: S$PBB,TH,, | Performed by: OBSTETRICS & GYNECOLOGY

## 2023-01-09 PROCEDURE — 99999 PR PBB SHADOW E&M-EST. PATIENT-LVL III: ICD-10-PCS | Mod: PBBFAC,,, | Performed by: OBSTETRICS & GYNECOLOGY

## 2023-01-09 PROCEDURE — 3074F SYST BP LT 130 MM HG: CPT | Mod: CPTII,,, | Performed by: OBSTETRICS & GYNECOLOGY

## 2023-01-09 PROCEDURE — 99204 OFFICE O/P NEW MOD 45 MIN: CPT | Mod: S$PBB,TH,, | Performed by: OBSTETRICS & GYNECOLOGY

## 2023-01-09 PROCEDURE — 86900 BLOOD TYPING SEROLOGIC ABO: CPT | Performed by: OBSTETRICS & GYNECOLOGY

## 2023-01-09 PROCEDURE — 36415 COLL VENOUS BLD VENIPUNCTURE: CPT | Performed by: OBSTETRICS & GYNECOLOGY

## 2023-01-09 PROCEDURE — 3078F PR MOST RECENT DIASTOLIC BLOOD PRESSURE < 80 MM HG: ICD-10-PCS | Mod: CPTII,,, | Performed by: OBSTETRICS & GYNECOLOGY

## 2023-01-09 NOTE — PROGRESS NOTES
Subjective:   Patient ID: Janett Urias is a 22 y.o. y.o. female.     Chief Complaint: Missed Menses       History of Present Illness:    Janett presents today complaining of amenorrhea. Patient's last menstrual period was 2022 (exact date).. Prior menstrual cycles have been regular. She reports breast tenderness, fatigue, frequent urination, morning sickness, nausea, and positive home pregnancy test. UPT is positive.       Past Medical History:   Diagnosis Date    Chlamydia      History reviewed. No pertinent surgical history.  Social History     Socioeconomic History    Marital status: Single   Tobacco Use    Smoking status: Never    Smokeless tobacco: Never   Substance and Sexual Activity    Alcohol use: No    Drug use: No    Sexual activity: Yes     Partners: Male     Birth control/protection: None     Comment: single     Family History   Problem Relation Age of Onset    Stroke Paternal Grandfather     Hypertension Paternal Grandmother     Stroke Maternal Grandfather     No Known Problems Father     Diabetes Mother     Hypertension Mother     No Known Problems Brother     Diabetes Sister     Breast cancer Neg Hx     Colon cancer Neg Hx     Ovarian cancer Neg Hx      OB History    Para Term  AB Living   2       1     SAB IAB Ectopic Multiple Live Births   1              # Outcome Date GA Lbr Khang/2nd Weight Sex Delivery Anes PTL Lv   2             1 SAB 2019 11w2d    SAB            ROS:   Review of Systems   Constitutional:  Positive for fatigue. Negative for chills, diaphoresis, fever and unexpected weight change.   HENT:  Negative for congestion, hearing loss, rhinorrhea and sore throat.    Eyes:  Negative for pain, discharge and visual disturbance.   Respiratory:  Negative for apnea, cough, shortness of breath and wheezing.    Cardiovascular:  Negative for chest pain, palpitations and leg swelling.   Gastrointestinal:  Positive for nausea. Negative for abdominal pain, constipation,  diarrhea and vomiting.   Endocrine: Negative for cold intolerance and heat intolerance.   Genitourinary:  Positive for frequency. Negative for difficulty urinating, dyspareunia, dysuria, flank pain, genital sores, hematuria, menstrual problem, pelvic pain, vaginal bleeding, vaginal discharge and vaginal pain.   Musculoskeletal:  Negative for arthralgias, back pain and joint swelling.   Skin:  Negative for rash.   Neurological:  Negative for dizziness, weakness, light-headedness, numbness and headaches.   Psychiatric/Behavioral:  Negative for agitation and confusion. The patient is not nervous/anxious.          Objective:   Vital Signs:  Vitals:    01/09/23 1451   BP: 126/76   Pulse: 98   Resp: 15       Physical Exam:  General:  alert, cooperative, no distress   Head: Normocephalic, atraumatic   Neck: Supple, Normal ROM   Respiratory: Normal effort   Neuro/Psych: Alert and oriented, appropriate mood and affect   Abdomen:   Soft, NT   Pelvis: External genitalia: normal general appearance  Urinary system: urethral meatus normal, bladder nontender  Vaginal: normal mucosa without prolapse or lesions  Cervix: normal appearance  Uterus: normal size, shape, position  Adnexa: normal size, nontender bilaterally   Ext: No edema       Assessment:      1. Pregnancy examination or test, positive result    2. Screening for STD (sexually transmitted disease)          Plan:        Pregnancy examination or test, positive result  -     Urine culture  -     POCT urine pregnancy  -     Rubella Antibody, IgG; Future; Expected date: 01/09/2023  -     US OB/GYN Procedure ([facility]) - Extended List; Future; Expected date: 01/23/2023  -     Type & Screen - Ob Profile; Future; Expected date: 01/09/2023  -     CBC Auto Differential; Future; Expected date: 01/09/2023  -     HEPATITIS C ANTIBODY; Future; Expected date: 01/09/2023    Screening for STD (sexually transmitted disease)        1. Pap not indicated  2. Prenatal labs ordered and GC/CT  performed. Pt already had some STD testing 3 weeks ago  3. Schedule Ultrasound  4. Follow up in 4 weeks after ultrasound.    Patient was counseled today on proper weight gain based on the [place] [employer]'s recommendations based on her pre-pregnancy weight. Discussed foods to avoid in pregnancy (i.e. sushi, fish that are high in mercury, deli meat, and unpasteurized cheeses). Discussed prenatal vitamin options (i.e. stool softener, DHA). She was also counseled on safe, healthy behavior as well as medications safe in pregnancy.

## 2023-01-10 LAB
HCV AB SERPL QL IA: NORMAL
RUBV IGG SER-ACNC: 46.6 IU/ML
RUBV IGG SER-IMP: REACTIVE

## 2023-01-11 RX ORDER — NITROFURANTOIN 25; 75 MG/1; MG/1
100 CAPSULE ORAL 2 TIMES DAILY
Qty: 14 CAPSULE | Refills: 0 | Status: SHIPPED | OUTPATIENT
Start: 2023-01-11 | End: 2023-01-18

## 2023-01-12 LAB — BACTERIA UR CULT: ABNORMAL

## 2023-01-17 ENCOUNTER — PATIENT MESSAGE (OUTPATIENT)
Dept: OBSTETRICS AND GYNECOLOGY | Facility: CLINIC | Age: 23
End: 2023-01-17
Payer: MEDICAID

## 2023-01-18 ENCOUNTER — CLINICAL SUPPORT (OUTPATIENT)
Dept: OBSTETRICS AND GYNECOLOGY | Facility: CLINIC | Age: 23
End: 2023-01-18
Payer: MEDICAID

## 2023-01-18 ENCOUNTER — TELEPHONE (OUTPATIENT)
Dept: OBSTETRICS AND GYNECOLOGY | Facility: CLINIC | Age: 23
End: 2023-01-18

## 2023-01-18 DIAGNOSIS — O23.41 URINARY TRACT INFECTION IN MOTHER DURING FIRST TRIMESTER OF PREGNANCY: Primary | ICD-10-CM

## 2023-01-18 DIAGNOSIS — O21.9 NAUSEA AND VOMITING DURING PREGNANCY: Primary | ICD-10-CM

## 2023-01-18 PROCEDURE — 96372 THER/PROPH/DIAG INJ SC/IM: CPT | Mod: PBBFAC

## 2023-01-18 RX ORDER — ONDANSETRON 4 MG/1
4 TABLET, ORALLY DISINTEGRATING ORAL EVERY 6 HOURS PRN
Qty: 30 TABLET | Refills: 3 | Status: SHIPPED | OUTPATIENT
Start: 2023-01-18 | End: 2023-02-17

## 2023-01-18 RX ORDER — CEFTRIAXONE 1 G/1
1 INJECTION, POWDER, FOR SOLUTION INTRAMUSCULAR; INTRAVENOUS
Status: COMPLETED | OUTPATIENT
Start: 2023-01-18 | End: 2023-01-18

## 2023-01-18 RX ORDER — LIDOCAINE HYDROCHLORIDE 10 MG/ML
1 INJECTION INFILTRATION; PERINEURAL
Status: COMPLETED | OUTPATIENT
Start: 2023-01-18 | End: 2023-01-18

## 2023-01-18 RX ADMIN — CEFTRIAXONE SODIUM 1 G: 1 INJECTION, POWDER, FOR SOLUTION INTRAMUSCULAR; INTRAVENOUS at 09:01

## 2023-01-18 RX ADMIN — LIDOCAINE HYDROCHLORIDE 1 ML: 10 INJECTION, SOLUTION INFILTRATION; PERINEURAL at 09:01

## 2023-01-18 NOTE — TELEPHONE ENCOUNTER
Pt was called and she stated she saw portal message and is on her way to receive injection. Pt complained of intermittent headaches and nausea. No meds tried for headache per pt. Per pt she has medication list given at last visit. Pt informed she can take tylenol as directed on sheet given and to contact clinic if symptoms are not relieved. Pt voiced understanding.  Pt desires rx for nausea medication to be sent to [facility] in [place]. Please advise. Thank you.

## 2023-01-18 NOTE — TELEPHONE ENCOUNTER
----- Message from Jerica Capone MA sent at 1/18/2023  8:07 AM CST -----  Contact: mauri Urias  MRN: [id number]  Home Phone      [phone]  Work Phone      Not on file.  Mobile          [phone]    Patient Care Team:  TRISTA Mckeon as PCP - General (General Practice)  Cheryl Flores MD as Consulting Physician (Obstetrics and Gynecology)  Salazar Hernández MD as Obstetrician (Obstetrics)  OB? Yes, Unknown  What phone number can you be reached at? [phone]  Message: Has questions regarding injection that is being.

## 2023-01-19 ENCOUNTER — PROCEDURE VISIT (OUTPATIENT)
Dept: OBSTETRICS AND GYNECOLOGY | Facility: CLINIC | Age: 23
End: 2023-01-19
Payer: MEDICAID

## 2023-01-19 ENCOUNTER — PATIENT MESSAGE (OUTPATIENT)
Dept: OBSTETRICS AND GYNECOLOGY | Facility: CLINIC | Age: 23
End: 2023-01-19
Payer: MEDICAID

## 2023-01-19 DIAGNOSIS — Z32.01 PREGNANCY EXAMINATION OR TEST, POSITIVE RESULT: ICD-10-CM

## 2023-01-19 DIAGNOSIS — O36.80X0 PREGNANCY WITH INCONCLUSIVE FETAL VIABILITY, SINGLE OR UNSPECIFIED FETUS: Primary | ICD-10-CM

## 2023-01-19 PROCEDURE — 76801 OB US < 14 WKS SINGLE FETUS: CPT | Mod: PBBFAC | Performed by: OBSTETRICS & GYNECOLOGY

## 2023-01-19 PROCEDURE — 76801 US OB/GYN EXTENDED PROCEDURE (VIEWPOINT): ICD-10-PCS | Mod: 26,S$PBB,, | Performed by: OBSTETRICS & GYNECOLOGY

## 2023-01-19 NOTE — TELEPHONE ENCOUNTER
Pt was called and pt she stated baby had FHTs on ultrasound, but concerned about the size of the baby on ultrasound. Pt saw results on portal. Informed pt that early in pregnancy the baby is not going to be big. Informed pt that Dr. Flores is out today and that once she reviews the results tomorrow she will inform her if ultrasound needs to be repeated of not. Pt voiced understanding. Please advise. Thank you.

## 2023-01-20 ENCOUNTER — TELEPHONE (OUTPATIENT)
Dept: OBSTETRICS AND GYNECOLOGY | Facility: CLINIC | Age: 23
End: 2023-01-20
Payer: MEDICAID

## 2023-01-20 NOTE — TELEPHONE ENCOUNTER
----- Message from Jerica Capone MA sent at 1/20/2023 10:47 AM CST -----  Contact: SUSI Urias  MRN: [id number]  Home Phone      [phone]  Work Phone      Not on file.  Mobile          [phone]    Patient Care Team:  TRISTA Mckeon as PCP - General (General Practice)  Cheryl Flores MD as Consulting Physician (Obstetrics and Gynecology)  Salazar Hernández MD as Obstetrician (Obstetrics)  OB? No  What phone number can you be reached at? [phone]  Message: Needs to schedule ultrasound.

## 2023-01-20 NOTE — TELEPHONE ENCOUNTER
Pt was called and informed that Dr. Flores has not reviewed ultrasound results and message from yesterday yet. Pt voiced understanding.

## 2023-01-20 NOTE — TELEPHONE ENCOUNTER
I called and spoke to Janett.  Please call and schedule follow up ultrasound per [facility] recs in about 11-14 days.

## 2023-01-23 ENCOUNTER — PATIENT MESSAGE (OUTPATIENT)
Dept: OBSTETRICS AND GYNECOLOGY | Facility: CLINIC | Age: 23
End: 2023-01-23
Payer: MEDICAID

## 2023-01-31 ENCOUNTER — PROCEDURE VISIT (OUTPATIENT)
Dept: OBSTETRICS AND GYNECOLOGY | Facility: CLINIC | Age: 23
End: 2023-01-31
Payer: MEDICAID

## 2023-01-31 DIAGNOSIS — O36.80X0 PREGNANCY WITH INCONCLUSIVE FETAL VIABILITY, SINGLE OR UNSPECIFIED FETUS: ICD-10-CM

## 2023-01-31 PROCEDURE — 76801 OB US < 14 WKS SINGLE FETUS: CPT | Mod: PBBFAC | Performed by: OBSTETRICS & GYNECOLOGY

## 2023-01-31 PROCEDURE — 76801 US OB/GYN EXTENDED PROCEDURE (VIEWPOINT): ICD-10-PCS | Mod: 26,S$PBB,, | Performed by: OBSTETRICS & GYNECOLOGY

## 2023-02-01 ENCOUNTER — TELEPHONE (OUTPATIENT)
Dept: OBSTETRICS AND GYNECOLOGY | Facility: CLINIC | Age: 23
End: 2023-02-01
Payer: MEDICAID

## 2023-02-01 NOTE — TELEPHONE ENCOUNTER
----- Message from Jerica Capone MA sent at 2/1/2023 12:53 PM CST -----  Contact: self  Janett Urias  MRN: [id number]  Home Phone      [phone]  Work Phone      Not on file.  Mobile          [phone]    Patient Care Team:  TRISTA Mckeon as PCP - General (General Practice)  Cheryl Flores MD as Consulting Physician (Obstetrics and Gynecology)  Salazar Hernández MD as Obstetrician (Obstetrics)  OB? Yes, Unknown  What phone number can you be reached at? [phone]  Message: Would like to know when she will be able to dye her hair while pregnant.

## 2023-02-06 ENCOUNTER — INITIAL PRENATAL (OUTPATIENT)
Dept: OBSTETRICS AND GYNECOLOGY | Facility: CLINIC | Age: 23
End: 2023-02-06
Payer: MEDICAID

## 2023-02-06 ENCOUNTER — PATIENT MESSAGE (OUTPATIENT)
Dept: OBSTETRICS AND GYNECOLOGY | Facility: CLINIC | Age: 23
End: 2023-02-06

## 2023-02-06 VITALS
HEART RATE: 90 BPM | WEIGHT: 252.19 LBS | BODY MASS INDEX: 47.65 KG/M2 | SYSTOLIC BLOOD PRESSURE: 118 MMHG | DIASTOLIC BLOOD PRESSURE: 76 MMHG

## 2023-02-06 DIAGNOSIS — O02.1 MISSED ABORTION: Primary | ICD-10-CM

## 2023-02-06 PROCEDURE — 99999 PR PBB SHADOW E&M-EST. PATIENT-LVL II: CPT | Mod: PBBFAC,,, | Performed by: OBSTETRICS & GYNECOLOGY

## 2023-02-06 PROCEDURE — 99213 PR OFFICE/OUTPT VISIT, EST, LEVL III, 20-29 MIN: ICD-10-PCS | Mod: TH,S$PBB,, | Performed by: OBSTETRICS & GYNECOLOGY

## 2023-02-06 PROCEDURE — 99212 OFFICE O/P EST SF 10 MIN: CPT | Mod: PBBFAC,TH | Performed by: OBSTETRICS & GYNECOLOGY

## 2023-02-06 PROCEDURE — 99213 OFFICE O/P EST LOW 20 MIN: CPT | Mod: TH,S$PBB,, | Performed by: OBSTETRICS & GYNECOLOGY

## 2023-02-06 PROCEDURE — 99999 PR PBB SHADOW E&M-EST. PATIENT-LVL II: ICD-10-PCS | Mod: PBBFAC,,, | Performed by: OBSTETRICS & GYNECOLOGY

## 2023-02-06 NOTE — PROGRESS NOTES
Pt here for initial OB visit.  Reports only N/V.  No VB.  No FHTs with vscan, confirmed with transabdominal and transvaginal probes.    Discussed diagnosis of missed  (miscarriage).  Pt declines D&C at this time.   Discussed management options.  Considering cytotec, but will contact me later to discuss.

## 2023-02-07 ENCOUNTER — LAB VISIT (OUTPATIENT)
Dept: LAB | Facility: HOSPITAL | Age: 23
End: 2023-02-07
Attending: OBSTETRICS & GYNECOLOGY
Payer: MEDICAID

## 2023-02-07 ENCOUNTER — PATIENT MESSAGE (OUTPATIENT)
Dept: OBSTETRICS AND GYNECOLOGY | Facility: CLINIC | Age: 23
End: 2023-02-07
Payer: MEDICAID

## 2023-02-07 DIAGNOSIS — O02.1 MISSED ABORTION: Primary | ICD-10-CM

## 2023-02-07 DIAGNOSIS — O02.1 MISSED ABORTION: ICD-10-CM

## 2023-02-07 LAB — HCG INTACT+B SERPL-ACNC: NORMAL MIU/ML

## 2023-02-07 PROCEDURE — 36415 COLL VENOUS BLD VENIPUNCTURE: CPT | Performed by: OBSTETRICS & GYNECOLOGY

## 2023-02-07 PROCEDURE — 84702 CHORIONIC GONADOTROPIN TEST: CPT | Performed by: OBSTETRICS & GYNECOLOGY

## 2023-02-07 NOTE — TELEPHONE ENCOUNTER
Informed pt that per Dr. Flores ultrasound can be done on 2/13/23. Appt for ultrasound @ 8 am with appt @ 10 am.

## 2023-02-07 NOTE — TELEPHONE ENCOUNTER
Called pt and informed her that I spoke with Dr. Flores. Appts ok to be moved to Monday 2/13/23. Appt scheduled for 2/13/23, ultrasound @ 8 am and visit @ 10 am. Pt voiced understanding.

## 2023-02-08 ENCOUNTER — PATIENT MESSAGE (OUTPATIENT)
Dept: OBSTETRICS AND GYNECOLOGY | Facility: CLINIC | Age: 23
End: 2023-02-08
Payer: MEDICAID

## 2023-02-08 DIAGNOSIS — O02.1 MISSED ABORTION: Primary | ICD-10-CM

## 2023-02-10 ENCOUNTER — LAB VISIT (OUTPATIENT)
Dept: LAB | Facility: HOSPITAL | Age: 23
End: 2023-02-10
Attending: OBSTETRICS & GYNECOLOGY
Payer: MEDICAID

## 2023-02-10 ENCOUNTER — TELEPHONE (OUTPATIENT)
Dept: OBSTETRICS AND GYNECOLOGY | Facility: CLINIC | Age: 23
End: 2023-02-10
Payer: MEDICAID

## 2023-02-10 ENCOUNTER — PATIENT MESSAGE (OUTPATIENT)
Dept: OBSTETRICS AND GYNECOLOGY | Facility: CLINIC | Age: 23
End: 2023-02-10
Payer: MEDICAID

## 2023-02-10 DIAGNOSIS — O02.1 MISSED ABORTION: ICD-10-CM

## 2023-02-10 LAB — HCG INTACT+B SERPL-ACNC: NORMAL MIU/ML

## 2023-02-10 PROCEDURE — 84702 CHORIONIC GONADOTROPIN TEST: CPT | Performed by: OBSTETRICS & GYNECOLOGY

## 2023-02-10 PROCEDURE — 36415 COLL VENOUS BLD VENIPUNCTURE: CPT | Performed by: OBSTETRICS & GYNECOLOGY

## 2023-02-10 RX ORDER — MISOPROSTOL 200 UG/1
400 TABLET ORAL 4 TIMES DAILY
Qty: 8 TABLET | Refills: 0 | Status: SHIPPED | OUTPATIENT
Start: 2023-02-10 | End: 2024-01-29

## 2023-02-10 NOTE — TELEPHONE ENCOUNTER
Thu from [facility] pharmacy called to confirm that cytotec was not sent in for an elective AB. Informed her that this is NOT for elective AB and is for a missed AB. She voiced understanding and verified that she is able to see missed ab diagnosis linked to rx.

## 2023-02-10 NOTE — TELEPHONE ENCOUNTER
Pt was called and informed that she can take tylenol. Pt spoke with Dr. Flores on the phone and will keep appts on Monday. Pt voiced understanding.

## 2023-02-10 NOTE — TELEPHONE ENCOUNTER
Discussed declining hcg with patient.  She has started to bleed and cramp but she would like to proceed with Cytotec medication to help with miscarriage process.   Cytotec sent to pharmacy.

## 2023-02-13 ENCOUNTER — PROCEDURE VISIT (OUTPATIENT)
Dept: OBSTETRICS AND GYNECOLOGY | Facility: CLINIC | Age: 23
End: 2023-02-13
Payer: MEDICAID

## 2023-02-13 ENCOUNTER — OFFICE VISIT (OUTPATIENT)
Dept: OBSTETRICS AND GYNECOLOGY | Facility: CLINIC | Age: 23
End: 2023-02-13
Payer: MEDICAID

## 2023-02-13 VITALS
DIASTOLIC BLOOD PRESSURE: 72 MMHG | HEIGHT: 61 IN | RESPIRATION RATE: 14 BRPM | HEART RATE: 90 BPM | SYSTOLIC BLOOD PRESSURE: 118 MMHG | BODY MASS INDEX: 46.63 KG/M2 | WEIGHT: 247 LBS

## 2023-02-13 DIAGNOSIS — O03.9 SAB (SPONTANEOUS ABORTION): Primary | ICD-10-CM

## 2023-02-13 DIAGNOSIS — O02.1 MISSED ABORTION: ICD-10-CM

## 2023-02-13 PROCEDURE — 99999 PR PBB SHADOW E&M-EST. PATIENT-LVL III: CPT | Mod: PBBFAC,,, | Performed by: OBSTETRICS & GYNECOLOGY

## 2023-02-13 PROCEDURE — 76801 PR US, OB <14WKS, TRANSABD, SINGLE GESTATION: ICD-10-PCS | Mod: 26,S$PBB,, | Performed by: OBSTETRICS & GYNECOLOGY

## 2023-02-13 PROCEDURE — 1159F PR MEDICATION LIST DOCUMENTED IN MEDICAL RECORD: ICD-10-PCS | Mod: CPTII,,, | Performed by: OBSTETRICS & GYNECOLOGY

## 2023-02-13 PROCEDURE — 3074F PR MOST RECENT SYSTOLIC BLOOD PRESSURE < 130 MM HG: ICD-10-PCS | Mod: CPTII,,, | Performed by: OBSTETRICS & GYNECOLOGY

## 2023-02-13 PROCEDURE — 99213 PR OFFICE/OUTPT VISIT, EST, LEVL III, 20-29 MIN: ICD-10-PCS | Mod: S$PBB,TH,, | Performed by: OBSTETRICS & GYNECOLOGY

## 2023-02-13 PROCEDURE — 3008F PR BODY MASS INDEX (BMI) DOCUMENTED: ICD-10-PCS | Mod: CPTII,,, | Performed by: OBSTETRICS & GYNECOLOGY

## 2023-02-13 PROCEDURE — 3078F DIAST BP <80 MM HG: CPT | Mod: CPTII,,, | Performed by: OBSTETRICS & GYNECOLOGY

## 2023-02-13 PROCEDURE — 99213 OFFICE O/P EST LOW 20 MIN: CPT | Mod: S$PBB,TH,, | Performed by: OBSTETRICS & GYNECOLOGY

## 2023-02-13 PROCEDURE — 1159F MED LIST DOCD IN RCRD: CPT | Mod: CPTII,,, | Performed by: OBSTETRICS & GYNECOLOGY

## 2023-02-13 PROCEDURE — 76801 OB US < 14 WKS SINGLE FETUS: CPT | Mod: PBBFAC | Performed by: OBSTETRICS & GYNECOLOGY

## 2023-02-13 PROCEDURE — 3074F SYST BP LT 130 MM HG: CPT | Mod: CPTII,,, | Performed by: OBSTETRICS & GYNECOLOGY

## 2023-02-13 PROCEDURE — 99999 PR PBB SHADOW E&M-EST. PATIENT-LVL III: ICD-10-PCS | Mod: PBBFAC,,, | Performed by: OBSTETRICS & GYNECOLOGY

## 2023-02-13 PROCEDURE — 3008F BODY MASS INDEX DOCD: CPT | Mod: CPTII,,, | Performed by: OBSTETRICS & GYNECOLOGY

## 2023-02-13 PROCEDURE — 99213 OFFICE O/P EST LOW 20 MIN: CPT | Mod: PBBFAC,TH,25 | Performed by: OBSTETRICS & GYNECOLOGY

## 2023-02-13 PROCEDURE — 76801 OB US < 14 WKS SINGLE FETUS: CPT | Mod: 26,S$PBB,, | Performed by: OBSTETRICS & GYNECOLOGY

## 2023-02-13 PROCEDURE — 3078F PR MOST RECENT DIASTOLIC BLOOD PRESSURE < 80 MM HG: ICD-10-PCS | Mod: CPTII,,, | Performed by: OBSTETRICS & GYNECOLOGY

## 2023-02-13 NOTE — PROGRESS NOTES
Subjective:       Patient ID: Janett Urias is a 22 y.o. female.    Chief Complaint:  Follow-up (sab) and Vaginal Bleeding      History of Present Illness  Follow-up  Pertinent negatives include no abdominal pain, arthralgias, chest pain, chills, congestion, coughing, diaphoresis, fatigue, fever, headaches, joint swelling, nausea, numbness, rash, sore throat, vomiting or weakness.   Vaginal Bleeding  The patient's primary symptoms include pelvic pain (cramping, mild). The patient's pertinent negatives include no vaginal discharge. Pertinent negatives include no abdominal pain, back pain, chills, constipation, diarrhea, dysuria, fever, flank pain, frequency, headaches, hematuria, nausea, rash, sore throat or vomiting.   Pt previously diagnosed with missed  here today for follow up.  She reports that she started having heavy bleeding with passage of clots on Friday and Saturday.  Pain improved after passage of large clot.  Bleeding now present but lighter.   Blood type A+    GYN & OB History  Patient's last menstrual period was 2022 (exact date).   Date of Last Pap: No result found    OB History    Para Term  AB Living   2       1     SAB IAB Ectopic Multiple Live Births   1              # Outcome Date GA Lbr Khang/2nd Weight Sex Delivery Anes PTL Lv   2 Current            1 SAB 2019 11w2d    SAB          Review of Systems  Review of Systems   Constitutional:  Negative for chills, diaphoresis, fatigue, fever and unexpected weight change.   HENT:  Negative for congestion, hearing loss, rhinorrhea and sore throat.    Eyes:  Negative for pain, discharge and visual disturbance.   Respiratory:  Negative for apnea, cough, shortness of breath and wheezing.    Cardiovascular:  Negative for chest pain, palpitations and leg swelling.   Gastrointestinal:  Negative for abdominal pain, constipation, diarrhea, nausea and vomiting.   Endocrine: Negative for cold intolerance and heat intolerance.    Genitourinary:  Positive for pelvic pain (cramping, mild) and vaginal bleeding. Negative for difficulty urinating, dyspareunia, dysuria, flank pain, frequency, genital sores, hematuria, menstrual problem, vaginal discharge and vaginal pain.   Musculoskeletal:  Negative for arthralgias, back pain and joint swelling.   Skin:  Negative for rash.   Neurological:  Negative for dizziness, weakness, light-headedness, numbness and headaches.   Psychiatric/Behavioral:  Negative for agitation and confusion. The patient is not nervous/anxious.          Objective:    Physical Exam:   Constitutional: She is oriented to person, place, and time. She appears well-developed and well-nourished. No distress.    HENT:   Head: Normocephalic and atraumatic.    Eyes: Conjunctivae and EOM are normal.      Pulmonary/Chest: Effort normal. No respiratory distress.          Genitourinary:    Uterus, right adnexa and left adnexa normal.   The external female genitalia was normal.   Cervix is normal. Right adnexum displays no tenderness. Left adnexum displays no tenderness. There is bleeding (mild, no clots) in the vagina. Cervix exhibits no motion tenderness. Uterus is not tender. Urethra findings: no tenderness          Musculoskeletal: Normal range of motion.       Neurological: She is alert and oriented to person, place, and time.    Skin: Skin is warm and dry.    Psychiatric: She has a normal mood and affect. Her behavior is normal. Judgment and thought content normal.        Assessment:        1. SAB (spontaneous )                Plan:      Janett was seen today for follow-up and vaginal bleeding.    Diagnoses and all orders for this visit:    SAB (spontaneous )      Ultrasound follow up today with interval passing of gestational sac.   Likely no additional treatment needed.  Bleeding precautions discussed.   Follow up as needed.

## 2023-03-07 ENCOUNTER — TELEPHONE (OUTPATIENT)
Dept: OBSTETRICS AND GYNECOLOGY | Facility: CLINIC | Age: 23
End: 2023-03-07
Payer: MEDICAID

## 2023-03-07 ENCOUNTER — PATIENT MESSAGE (OUTPATIENT)
Dept: OBSTETRICS AND GYNECOLOGY | Facility: CLINIC | Age: 23
End: 2023-03-07
Payer: MEDICAID

## 2023-03-07 NOTE — TELEPHONE ENCOUNTER
----- Message from Karla De Leon sent at 3/7/2023  1:26 PM CST -----  Contact: self  Janett Urias  MRN: [id number]  Home Phone      [phone]  Work Phone      Not on file.  Mobile          [phone]    Patient Care Team:  TRISTA Mckeon as PCP - General (General Practice)  Cheryl Flores MD as Consulting Physician (Obstetrics and Gynecology)  Salazar Hernández MD as Obstetrician (Obstetrics)  OB? No  What phone number can you be reached at? [phone]  Message: patient is wanting to schedule a check up appointment after miscarriage.

## 2023-03-08 ENCOUNTER — OFFICE VISIT (OUTPATIENT)
Dept: OBSTETRICS AND GYNECOLOGY | Facility: CLINIC | Age: 23
End: 2023-03-08
Payer: MEDICAID

## 2023-03-08 VITALS
SYSTOLIC BLOOD PRESSURE: 120 MMHG | WEIGHT: 248.81 LBS | HEIGHT: 61 IN | BODY MASS INDEX: 46.98 KG/M2 | HEART RATE: 71 BPM | RESPIRATION RATE: 14 BRPM | DIASTOLIC BLOOD PRESSURE: 78 MMHG

## 2023-03-08 DIAGNOSIS — Z30.09 GENERAL COUNSELLING AND ADVICE ON CONTRACEPTION: ICD-10-CM

## 2023-03-08 DIAGNOSIS — Z51.89 FOLLOW-UP VISIT AFTER MISCARRIAGE: Primary | ICD-10-CM

## 2023-03-08 DIAGNOSIS — O03.9 FOLLOW-UP VISIT AFTER MISCARRIAGE: Primary | ICD-10-CM

## 2023-03-08 PROCEDURE — 3008F PR BODY MASS INDEX (BMI) DOCUMENTED: ICD-10-PCS | Mod: CPTII,,, | Performed by: OBSTETRICS & GYNECOLOGY

## 2023-03-08 PROCEDURE — 1160F RVW MEDS BY RX/DR IN RCRD: CPT | Mod: CPTII,,, | Performed by: OBSTETRICS & GYNECOLOGY

## 2023-03-08 PROCEDURE — 3008F BODY MASS INDEX DOCD: CPT | Mod: CPTII,,, | Performed by: OBSTETRICS & GYNECOLOGY

## 2023-03-08 PROCEDURE — 99213 OFFICE O/P EST LOW 20 MIN: CPT | Mod: S$PBB,TH,, | Performed by: OBSTETRICS & GYNECOLOGY

## 2023-03-08 PROCEDURE — 3074F PR MOST RECENT SYSTOLIC BLOOD PRESSURE < 130 MM HG: ICD-10-PCS | Mod: CPTII,,, | Performed by: OBSTETRICS & GYNECOLOGY

## 2023-03-08 PROCEDURE — 99213 OFFICE O/P EST LOW 20 MIN: CPT | Mod: PBBFAC,TH | Performed by: OBSTETRICS & GYNECOLOGY

## 2023-03-08 PROCEDURE — 1160F PR REVIEW ALL MEDS BY PRESCRIBER/CLIN PHARMACIST DOCUMENTED: ICD-10-PCS | Mod: CPTII,,, | Performed by: OBSTETRICS & GYNECOLOGY

## 2023-03-08 PROCEDURE — 3078F DIAST BP <80 MM HG: CPT | Mod: CPTII,,, | Performed by: OBSTETRICS & GYNECOLOGY

## 2023-03-08 PROCEDURE — 99999 PR PBB SHADOW E&M-EST. PATIENT-LVL III: ICD-10-PCS | Mod: PBBFAC,,, | Performed by: OBSTETRICS & GYNECOLOGY

## 2023-03-08 PROCEDURE — 3074F SYST BP LT 130 MM HG: CPT | Mod: CPTII,,, | Performed by: OBSTETRICS & GYNECOLOGY

## 2023-03-08 PROCEDURE — 1159F PR MEDICATION LIST DOCUMENTED IN MEDICAL RECORD: ICD-10-PCS | Mod: CPTII,,, | Performed by: OBSTETRICS & GYNECOLOGY

## 2023-03-08 PROCEDURE — 1159F MED LIST DOCD IN RCRD: CPT | Mod: CPTII,,, | Performed by: OBSTETRICS & GYNECOLOGY

## 2023-03-08 PROCEDURE — 99999 PR PBB SHADOW E&M-EST. PATIENT-LVL III: CPT | Mod: PBBFAC,,, | Performed by: OBSTETRICS & GYNECOLOGY

## 2023-03-08 PROCEDURE — 99213 PR OFFICE/OUTPT VISIT, EST, LEVL III, 20-29 MIN: ICD-10-PCS | Mod: S$PBB,TH,, | Performed by: OBSTETRICS & GYNECOLOGY

## 2023-03-08 PROCEDURE — 3078F PR MOST RECENT DIASTOLIC BLOOD PRESSURE < 80 MM HG: ICD-10-PCS | Mod: CPTII,,, | Performed by: OBSTETRICS & GYNECOLOGY

## 2023-03-08 NOTE — PROGRESS NOTES
Subjective:       Patient ID: Janett Urias is a 22 y.o. female.    Chief Complaint:  Follow-up      History of Present Illness  Pt here for follow up after miscarriage. She had a negative home UPT test following SAB.   She has stopped bleeding. Has not had a period yet. She is uncertain about contraception, but planning condoms at this time.   She reports mood is appropriate.    GYN & OB History  Patient's last menstrual period was 2022 (exact date).   Date of Last Pap: No result found    OB History    Para Term  AB Living   2       2     SAB IAB Ectopic Multiple Live Births   2              # Outcome Date GA Lbr Khang/2nd Weight Sex Delivery Anes PTL Lv   2 SAB 2022 8w2d    SAB      1 SAB 2019 11w2d    SAB          Review of Systems  Review of Systems   Constitutional:  Negative for chills, diaphoresis, fatigue, fever and unexpected weight change.   HENT:  Negative for congestion, hearing loss, rhinorrhea and sore throat.    Eyes:  Negative for pain, discharge and visual disturbance.   Respiratory:  Negative for apnea, cough, shortness of breath and wheezing.    Cardiovascular:  Negative for chest pain, palpitations and leg swelling.   Gastrointestinal:  Negative for abdominal pain, constipation, diarrhea, nausea and vomiting.   Endocrine: Negative for cold intolerance and heat intolerance.   Genitourinary:  Negative for difficulty urinating, dyspareunia, dysuria, flank pain, frequency, genital sores, hematuria, menstrual problem, pelvic pain, vaginal bleeding, vaginal discharge and vaginal pain.   Musculoskeletal:  Negative for arthralgias, back pain and joint swelling.   Skin:  Negative for rash.   Neurological:  Negative for dizziness, weakness, light-headedness, numbness and headaches.   Psychiatric/Behavioral:  Negative for agitation and confusion. The patient is not nervous/anxious.          Objective:    Physical Exam:   Constitutional: She is oriented to person, place, and time. She  appears well-developed and well-nourished. No distress.    HENT:   Head: Normocephalic and atraumatic.    Eyes: Conjunctivae and EOM are normal.      Pulmonary/Chest: Effort normal. No respiratory distress.                  Musculoskeletal: Normal range of motion.       Neurological: She is alert and oriented to person, place, and time.    Skin: Skin is warm and dry.    Psychiatric: She has a normal mood and affect. Her behavior is normal. Judgment and thought content normal.        Assessment:        1. Follow-up visit after miscarriage    2. General counselling and advice on contraception               Plan:      Janett was seen today for follow-up.    Diagnoses and all orders for this visit:    Follow-up visit after miscarriage    General counselling and advice on contraception

## 2023-04-10 ENCOUNTER — PATIENT MESSAGE (OUTPATIENT)
Dept: OBSTETRICS AND GYNECOLOGY | Facility: CLINIC | Age: 23
End: 2023-04-10
Payer: MEDICAID

## 2023-04-10 RX ORDER — VALACYCLOVIR HYDROCHLORIDE 1 G/1
1000 TABLET, FILM COATED ORAL DAILY
Qty: 30 TABLET | Refills: 1 | Status: SHIPPED | OUTPATIENT
Start: 2023-04-10 | End: 2023-05-03

## 2023-04-10 NOTE — TELEPHONE ENCOUNTER
Janett desire refill of Valtrex.   Patient Active Problem List   Diagnosis    Chlamydia infection affecting pregnancy    Vaginal bleeding in pregnancy    Spontaneous     Reactive depression    Infertility, female     Prior to Admission medications    Medication Sig Start Date End Date Taking? Authorizing Provider   miSOPROStoL (CYTOTEC) 200 MCG Tab Place 2 tablets (400 mcg total) inside cheek 4 (four) times daily. for 4 doses 2/10/23 2/11/23  Cheryl Flores MD   valACYclovir (VALTREX) 1000 MG tablet Take 1 tablet (1,000 mg total) by mouth once daily. for 1 day 22  Cheryl Flores MD

## 2023-05-03 ENCOUNTER — PATIENT MESSAGE (OUTPATIENT)
Dept: OBSTETRICS AND GYNECOLOGY | Facility: CLINIC | Age: 23
End: 2023-05-03
Payer: MEDICAID

## 2023-05-03 RX ORDER — VALACYCLOVIR HYDROCHLORIDE 1 G/1
1000 TABLET, FILM COATED ORAL DAILY
Qty: 30 TABLET | Refills: 3 | Status: SHIPPED | OUTPATIENT
Start: 2023-05-03 | End: 2024-01-29

## 2023-05-03 NOTE — TELEPHONE ENCOUNTER
Janett desire refill of Valtrex  Patient Active Problem List   Diagnosis    Chlamydia infection affecting pregnancy    Vaginal bleeding in pregnancy    Spontaneous     Reactive depression    Infertility, female     Prior to Admission medications    Medication Sig Start Date End Date Taking? Authorizing Provider   miSOPROStoL (CYTOTEC) 200 MCG Tab Place 2 tablets (400 mcg total) inside cheek 4 (four) times daily. for 4 doses 2/10/23 2/11/23  Cheryl Flores MD   valACYclovir (VALTREX) 1000 MG tablet Take 1 tablet (1,000 mg total) by mouth once daily. for 5 days 4/10/23 4/15/23  Cheryl Flores MD

## 2023-05-25 ENCOUNTER — PATIENT MESSAGE (OUTPATIENT)
Dept: OBSTETRICS AND GYNECOLOGY | Facility: CLINIC | Age: 23
End: 2023-05-25
Payer: MEDICAID

## 2023-08-08 ENCOUNTER — TELEPHONE (OUTPATIENT)
Dept: OBSTETRICS AND GYNECOLOGY | Facility: CLINIC | Age: 23
End: 2023-08-08
Payer: MEDICAID

## 2023-08-08 NOTE — TELEPHONE ENCOUNTER
----- Message from Jerica Capone MA sent at 8/8/2023  9:55 AM CDT -----  Contact: self  Janett Urias  MRN: [id number]  Home Phone      [phone]  Work Phone      Not on file.  Mobile          [phone]    Patient Care Team:  Chester Phillips PA as PCP - General (General Practice)  Cheryl Flores MD as Consulting Physician (Obstetrics and Gynecology)  Salazar Hernández MD as Obstetrician (Obstetrics)  OB? No  What phone number can you be reached at? [phone]  Message:  Would like to see about getting test ordered to check for PCOS.  Stated on last visit, Dr Flores had told her that there is a test they can do to check for PCOS.

## 2023-08-08 NOTE — TELEPHONE ENCOUNTER
Pt was called and she desires to discuss PCOS testing. Appt scheduled for annual and to discuss possible labs. Pt voiced understanding.

## 2023-10-13 ENCOUNTER — PATIENT MESSAGE (OUTPATIENT)
Dept: OBSTETRICS AND GYNECOLOGY | Facility: CLINIC | Age: 23
End: 2023-10-13
Payer: MEDICAID

## 2023-12-13 ENCOUNTER — PATIENT MESSAGE (OUTPATIENT)
Dept: OBSTETRICS AND GYNECOLOGY | Facility: CLINIC | Age: 23
End: 2023-12-13

## 2024-01-23 ENCOUNTER — PATIENT MESSAGE (OUTPATIENT)
Dept: OBSTETRICS AND GYNECOLOGY | Facility: CLINIC | Age: 24
End: 2024-01-23
Payer: MEDICAID

## 2024-01-29 ENCOUNTER — LAB VISIT (OUTPATIENT)
Dept: LAB | Facility: HOSPITAL | Age: 24
End: 2024-01-29
Attending: OBSTETRICS & GYNECOLOGY
Payer: MEDICAID

## 2024-01-29 ENCOUNTER — OFFICE VISIT (OUTPATIENT)
Dept: OBSTETRICS AND GYNECOLOGY | Facility: CLINIC | Age: 24
End: 2024-01-29
Payer: MEDICAID

## 2024-01-29 VITALS
HEART RATE: 69 BPM | DIASTOLIC BLOOD PRESSURE: 76 MMHG | WEIGHT: 228.88 LBS | SYSTOLIC BLOOD PRESSURE: 126 MMHG | HEIGHT: 61 IN | OXYGEN SATURATION: 99 % | BODY MASS INDEX: 43.21 KG/M2

## 2024-01-29 DIAGNOSIS — Z31.9 INFERTILITY MANAGEMENT: ICD-10-CM

## 2024-01-29 DIAGNOSIS — Z31.9 INFERTILITY MANAGEMENT: Primary | ICD-10-CM

## 2024-01-29 PROCEDURE — 3078F DIAST BP <80 MM HG: CPT | Mod: CPTII,,, | Performed by: OBSTETRICS & GYNECOLOGY

## 2024-01-29 PROCEDURE — 1159F MED LIST DOCD IN RCRD: CPT | Mod: CPTII,,, | Performed by: OBSTETRICS & GYNECOLOGY

## 2024-01-29 PROCEDURE — 1160F RVW MEDS BY RX/DR IN RCRD: CPT | Mod: CPTII,,, | Performed by: OBSTETRICS & GYNECOLOGY

## 2024-01-29 PROCEDURE — 3074F SYST BP LT 130 MM HG: CPT | Mod: CPTII,,, | Performed by: OBSTETRICS & GYNECOLOGY

## 2024-01-29 PROCEDURE — 99213 OFFICE O/P EST LOW 20 MIN: CPT | Mod: PBBFAC | Performed by: OBSTETRICS & GYNECOLOGY

## 2024-01-29 PROCEDURE — 3008F BODY MASS INDEX DOCD: CPT | Mod: CPTII,,, | Performed by: OBSTETRICS & GYNECOLOGY

## 2024-01-29 PROCEDURE — 99999 PR PBB SHADOW E&M-EST. PATIENT-LVL III: CPT | Mod: PBBFAC,,, | Performed by: OBSTETRICS & GYNECOLOGY

## 2024-01-29 PROCEDURE — 36415 COLL VENOUS BLD VENIPUNCTURE: CPT | Performed by: OBSTETRICS & GYNECOLOGY

## 2024-01-29 PROCEDURE — 84402 ASSAY OF FREE TESTOSTERONE: CPT | Performed by: OBSTETRICS & GYNECOLOGY

## 2024-01-29 PROCEDURE — 99213 OFFICE O/P EST LOW 20 MIN: CPT | Mod: S$PBB,,, | Performed by: OBSTETRICS & GYNECOLOGY

## 2024-01-29 PROCEDURE — 82627 DEHYDROEPIANDROSTERONE: CPT | Performed by: OBSTETRICS & GYNECOLOGY

## 2024-01-29 RX ORDER — SCOLOPAMINE TRANSDERMAL SYSTEM 1 MG/1
1 PATCH, EXTENDED RELEASE TRANSDERMAL
COMMUNITY
Start: 2024-01-16

## 2024-01-29 RX ORDER — FLUTICASONE PROPIONATE 50 MCG
2 SPRAY, SUSPENSION (ML) NASAL DAILY PRN
COMMUNITY
Start: 2024-01-05

## 2024-01-29 NOTE — PROGRESS NOTES
Subjective:       Patient ID: Janett Urias is a 23 y.o. female.    Chief Complaint:  fertility      History of Present Illness  HPI  Infertility  Patient presents for evaluation of infertility. Patient and partner have been attempting conception for 1 year.    Menstrual and Endocrine History  LMP Patient's last menstrual period was 2024 (exact date).   average interval 30-32 days   Duration of flow 2-5 days   Heavy menses no   Clots no   Intermenstrual bleeding no   Postcoital bleeding no   Dysmenorrhea no   Amenorrhea no   Weight change no   Hirsutism no   Balding no   Acne no   Galactorrhea no     Obstetrical History  History: Spontaneous AB: 2      GYN & OB History  Patient's last menstrual period was 2024 (exact date).   Date of Last Pap: No result found    OB History    Para Term  AB Living   2       2     SAB IAB Ectopic Multiple Live Births   2              # Outcome Date GA Lbr Khang/2nd Weight Sex Delivery Anes PTL Lv   2 SAB 2022 8w2d    SAB      1 SAB 2019 11w2d    SAB          Review of Systems  Review of Systems   Constitutional:  Negative for chills, diaphoresis, fatigue, fever and unexpected weight change.   HENT:  Negative for congestion, hearing loss, postnasal drip, rhinorrhea, sinus pressure, sinus pain, sore throat and tinnitus.    Eyes:  Negative for pain, discharge and visual disturbance.   Respiratory:  Negative for apnea, cough, shortness of breath and wheezing.    Cardiovascular:  Negative for chest pain, palpitations and leg swelling.   Gastrointestinal:  Negative for abdominal pain, constipation, diarrhea, nausea and vomiting.   Endocrine: Negative for cold intolerance, heat intolerance, polydipsia, polyphagia and polyuria.   Genitourinary:  Negative for difficulty urinating, dyspareunia, dysuria, enuresis, flank pain, frequency, genital sores, hematuria, menstrual problem, pelvic pain, urgency, vaginal bleeding, vaginal discharge and vaginal pain.    Musculoskeletal:  Negative for arthralgias, back pain, joint swelling, myalgias, neck pain and neck stiffness.   Skin:  Negative for color change, pallor and rash.   Allergic/Immunologic: Negative for environmental allergies, food allergies and immunocompromised state.   Neurological:  Negative for dizziness, weakness, light-headedness, numbness and headaches.   Hematological:  Negative for adenopathy. Does not bruise/bleed easily.   Psychiatric/Behavioral:  Negative for agitation and confusion. The patient is not nervous/anxious.            Objective:    Physical Exam:   Constitutional: She is oriented to person, place, and time. She appears well-developed and well-nourished. No distress.    HENT:   Head: Normocephalic and atraumatic.    Eyes: Conjunctivae and EOM are normal.      Pulmonary/Chest: Effort normal. No respiratory distress.                  Musculoskeletal: Normal range of motion.       Neurological: She is alert and oriented to person, place, and time.    Skin: Skin is warm and dry.    Psychiatric: She has a normal mood and affect. Her behavior is normal. Judgment and thought content normal.          Assessment:        1. Infertility management               Plan:      Janett was seen today for fertility.    Diagnoses and all orders for this visit:    Infertility management  -     Testosterone, Free; Future  -     DHEA-SULFATE; Future      Normal SA in [facility]  Would like to have follow up hcg.  Last hcg in 2020.  Will call on D1 of cycle to schedule

## 2024-01-30 LAB — DHEA-S SERPL-MCNC: 271 UG/DL (ref 134.2–407.4)

## 2024-02-01 LAB — TESTOST FREE SERPL-MCNC: 2.6 PG/ML

## 2024-02-15 ENCOUNTER — PATIENT MESSAGE (OUTPATIENT)
Dept: OBSTETRICS AND GYNECOLOGY | Facility: CLINIC | Age: 24
End: 2024-02-15
Payer: MEDICAID

## 2024-02-15 RX ORDER — VALACYCLOVIR HYDROCHLORIDE 1 G/1
1000 TABLET, FILM COATED ORAL DAILY
Qty: 30 TABLET | Refills: 1 | OUTPATIENT
Start: 2024-02-15 | End: 2024-02-20

## 2024-02-15 RX ORDER — VALACYCLOVIR HYDROCHLORIDE 1 G/1
1000 TABLET, FILM COATED ORAL DAILY
Qty: 30 TABLET | Refills: 0 | Status: SHIPPED | OUTPATIENT
Start: 2024-02-15 | End: 2024-02-20

## 2024-02-15 NOTE — TELEPHONE ENCOUNTER
Refill Decision Note   Janett Webstertim  is requesting a refill authorization.  Brief Assessment and Rationale for Refill:  Quick Discontinue     Medication Therapy Plan:  E-Prescribing Status: Receipt confirmed by pharmacy (2/15/2024      Comments:     Note composed:1:43 PM 02/15/2024

## 2024-03-17 ENCOUNTER — HOSPITAL ENCOUNTER (EMERGENCY)
Facility: HOSPITAL | Age: 24
Discharge: HOME | End: 2024-03-17
Attending: EMERGENCY MEDICINE

## 2024-03-17 VITALS
TEMPERATURE: 98 F | DIASTOLIC BLOOD PRESSURE: 87 MMHG | SYSTOLIC BLOOD PRESSURE: 139 MMHG | OXYGEN SATURATION: 97 % | BODY MASS INDEX: 42.75 KG/M2 | HEIGHT: 61 IN | RESPIRATION RATE: 19 BRPM | HEART RATE: 91 BPM | WEIGHT: 226.44 LBS

## 2024-03-17 DIAGNOSIS — S93.402A SPRAIN OF LEFT ANKLE, UNSPECIFIED LIGAMENT, INITIAL ENCOUNTER: ICD-10-CM

## 2024-03-17 DIAGNOSIS — R52 PAIN: ICD-10-CM

## 2024-03-17 DIAGNOSIS — Q68.8 OS TRIGONUM: Primary | ICD-10-CM

## 2024-03-17 PROCEDURE — 99283 EMERGENCY DEPT VISIT LOW MDM: CPT | Mod: 25

## 2024-03-17 RX ORDER — IBUPROFEN 800 MG/1
800 TABLET ORAL EVERY 8 HOURS PRN
Qty: 20 TABLET | Refills: 0 | Status: SHIPPED | OUTPATIENT
Start: 2024-03-17

## 2024-03-18 NOTE — ED PROVIDER NOTES
Encounter Date: 3/17/2024       History     Chief Complaint   Patient presents with    Ankle Pain     Pt to ED with c/o left ankle pain that began this AM. Denies recent falls or trauma.      Janett Urias is a 23 y.o. female with no significant PMH presenting to the ED for evaluation of left ankle pain.  Patient reports that she went out clubbing last night and woke up this morning with left lateral ankle pain.  Pain is described as throbbing, exacerbated by movement, currently rated 6/10 in severity.  She denies associated swelling, redness, warmth.  She reports that she does not remember exactly what happened to ankle but she did do a lot of walking.     The history is provided by the patient.     Review of patient's allergies indicates:  No Known Allergies  Past Medical History:   Diagnosis Date    Chlamydia      History reviewed. No pertinent surgical history.  Family History   Problem Relation Age of Onset    Stroke Paternal Grandfather     Hypertension Paternal Grandmother     Stroke Maternal Grandfather     No Known Problems Father     Diabetes Mother     Hypertension Mother     No Known Problems Brother     Diabetes Sister     Breast cancer Neg Hx     Colon cancer Neg Hx     Ovarian cancer Neg Hx      Social History     Tobacco Use    Smoking status: Never    Smokeless tobacco: Never   Substance Use Topics    Alcohol use: No    Drug use: No     Review of Systems   Constitutional:  Negative for activity change, chills and fever.   HENT:  Negative for congestion, ear discharge, ear pain, postnasal drip, sinus pressure, sinus pain and sore throat.    Respiratory:  Negative for cough, chest tightness and shortness of breath.    Cardiovascular:  Negative for chest pain.   Gastrointestinal:  Negative for abdominal distention, abdominal pain and nausea.   Genitourinary:  Negative for dysuria, frequency and urgency.   Musculoskeletal:  Positive for arthralgias (L ankle). Negative for back pain.   Skin:  Negative  for rash.   Neurological:  Negative for dizziness, weakness, light-headedness and numbness.   Hematological:  Does not bruise/bleed easily.       Physical Exam     Initial Vitals [03/17/24 2025]   BP Pulse Resp Temp SpO2   139/87 91 19 98.3 °F (36.8 °C) 97 %      MAP       --         Physical Exam    Nursing note and vitals reviewed.  Constitutional: She appears well-developed and well-nourished.   HENT:   Head: Normocephalic and atraumatic.   Eyes: Conjunctivae and EOM are normal. Pupils are equal, round, and reactive to light.   Neck: Neck supple.   Cardiovascular:  Normal rate, regular rhythm, normal heart sounds and intact distal pulses.           Pulmonary/Chest: Breath sounds normal.   Abdominal: Abdomen is soft. Bowel sounds are normal.   Musculoskeletal:      Cervical back: Neck supple.      Left ankle: Tenderness present over the lateral malleolus. Decreased range of motion.     Neurological: She is alert and oriented to person, place, and time. She has normal strength.   Skin: Skin is warm and dry.   Psychiatric: She has a normal mood and affect. Her behavior is normal. Judgment and thought content normal.         ED Course   Procedures  Labs Reviewed - No data to display       Imaging Results              X-Ray Ankle Complete Left (Final result)  Result time 03/17/24 21:16:18      Final result by Chel Leonardo MD (03/17/24 21:16:18)                   Impression:      As above.      Electronically signed by: Chel Leonardo  Date:    03/17/2024  Time:    21:16               Narrative:    EXAMINATION:  XR ANKLE COMPLETE 3 VIEW LEFT    CLINICAL HISTORY:  Pain, unspecified    TECHNIQUE:  AP, lateral and oblique views of the left ankle were performed.    COMPARISON:  None    FINDINGS:  No acute fracture or dislocation.  Chronic healed fracture deformity of the distal fibula.  Joint spaces and ankle mortise are maintained.  Small os trigonum.  Mild Achilles enthesopathy.                                        Medications - No data to display  Medical Decision Making  Evaluation of a 23-year-old female with left lateral ankle pain for the past 2 days  Presents with lateral tenderness with no obvious swelling or deformities with good distal pulses.  No associated redness or warmth.    Differential diagnosis includes ankle sprain, fracture, contusion, gout    Problems Addressed:  Sprain of left ankle, unspecified ligament, initial encounter: acute illness or injury    Amount and/or Complexity of Data Reviewed  Radiology: ordered. Decision-making details documented in ED Course.    Risk  Prescription drug management.  Risk Details: Stable for DC home.  Patient likely injured her ankle yesterday p.m. unsure of mechanism of injury, but she does have lateral tenderness.  Ankle boot applied per patient request.  Instructed Tylenol and ibuprofen at home for pain control.  Outpatient follow-up with orthopedist. Patient/caregiver voices understanding and feels comfortable with discharge plan.      The patient acknowledges that close follow up with medical provider is required. Instructed to follow up with PCP within 2 days. Patient was given specific return precautions. The patient agrees to comply with all instruction and directions given in the ER.                                        Clinical Impression:  Final diagnoses:  [R52] Pain  [Q68.8] Os trigonum (Primary)  [S93.402A] Sprain of left ankle, unspecified ligament, initial encounter          ED Disposition Condition    Discharge Stable          ED Prescriptions       Medication Sig Dispense Start Date End Date Auth. Provider    ibuprofen (ADVIL,MOTRIN) 800 MG tablet Take 1 tablet (800 mg total) by mouth every 8 (eight) hours as needed for Pain. 20 tablet 3/17/2024 -- Karol Moyer NP          Follow-up Information       Follow up With Specialties Details Why Contact Info    Chester Phillips, PA General Practice Schedule an appointment as soon as possible  for a visit in 2 days  [address]  [address]RD FLO[address]  [facility]  [place] [address]  [phone]               Karol Moyer, NP  03/17/24 0925

## 2024-08-12 ENCOUNTER — TELEPHONE (OUTPATIENT)
Dept: OBSTETRICS AND GYNECOLOGY | Facility: CLINIC | Age: 24
End: 2024-08-12

## 2024-08-12 NOTE — TELEPHONE ENCOUNTER
Pt was called and she desires rx of clomid. Pt LMP was 8/4/24 and informed that she needs to contact us on CD 1 of menses to get the rx. Reviewed last visit on 1/29/24 with pt and asked about HSG. Pt desires to do one month of clomid first before getting HSG done. Pt voiced understanding.

## 2024-08-12 NOTE — TELEPHONE ENCOUNTER
----- Message from Fany Verde sent at 8/12/2024  2:37 PM CDT -----  Contact: Self  Janett Urias  MRN: [id number]  Home Phone      [phone]  Work Phone      Not on file.  Mobile          [phone]    Patient Care Team:  Chester Phillips PA as PCP - General (General Practice)  Cheryl Flores MD as Consulting Physician (Obstetrics and Gynecology)  Salazar Hernández MD as Obstetrician (Obstetrics)  OB? No  What phone number can you be reached at? [phone]  Message: pt needs refill of Clomid  Pharmacy: [facility] in [place]

## 2024-08-27 ENCOUNTER — TELEPHONE (OUTPATIENT)
Dept: OBSTETRICS AND GYNECOLOGY | Facility: CLINIC | Age: 24
End: 2024-08-27

## 2024-08-27 DIAGNOSIS — Z31.9 INFERTILITY MANAGEMENT: Primary | ICD-10-CM

## 2024-08-27 RX ORDER — CLOMIPHENE CITRATE 50 MG/1
50 TABLET ORAL DAILY
Qty: 5 TABLET | Refills: 0 | Status: SHIPPED | OUTPATIENT
Start: 2024-08-27 | End: 2024-09-01

## 2024-08-27 NOTE — TELEPHONE ENCOUNTER
Clomid sent to pharmacy.  Please instruct on importance of peak +7 progesterone lab draw (have lab done 7 days after her home urine ovulation test is positive.)  If no home tests are positive, have lab done on CD21

## 2024-08-27 NOTE — TELEPHONE ENCOUNTER
Pt was called and stated she will be starting her period on Friday and wants rx of clomid sent to her pharmacy. Pt concerned that she will start and office will be closed and Monday is a holiday. Pt desires to see if it can be sent in now to have on hand. Pt was seen for infertility 1/29/24 and was told to contact clinic on CD 1. If this one round did not work an HSG was discussed. Please advise. Thank you.

## 2024-08-27 NOTE — TELEPHONE ENCOUNTER
----- Message from Fany Ammon sent at 8/27/2024  3:40 PM CDT -----  Contact: Self  Janett Urias  MRN: [id number]  Home Phone      Not on file.  Work Phone      Not on file.  Mobile          [phone]    Patient Care Team:  Chester Phillips PA as PCP - General (General Practice)  Cheryl Flores MD as Consulting Physician (Obstetrics and Gynecology)  Salazar Hernández MD as Obstetrician (Obstetrics)  OB? No  What phone number can you be reached at? [phone]  Message: pt states she has started her period and needs Clomid sent in. Also wants order for progesterone put in  Pharmacy: [facility] in [place]

## 2024-09-04 ENCOUNTER — PATIENT MESSAGE (OUTPATIENT)
Dept: OBSTETRICS AND GYNECOLOGY | Facility: CLINIC | Age: 24
End: 2024-09-04

## 2024-09-04 DIAGNOSIS — Z32.00 ENCOUNTER FOR PREGNANCY TEST, RESULT UNKNOWN: Primary | ICD-10-CM

## 2024-09-05 ENCOUNTER — LAB VISIT (OUTPATIENT)
Dept: LAB | Facility: HOSPITAL | Age: 24
End: 2024-09-05
Attending: OBSTETRICS & GYNECOLOGY

## 2024-09-05 DIAGNOSIS — Z32.00 ENCOUNTER FOR PREGNANCY TEST, RESULT UNKNOWN: ICD-10-CM

## 2024-09-05 LAB — HCG INTACT+B SERPL-ACNC: <1.2 MIU/ML

## 2024-09-05 PROCEDURE — 36415 COLL VENOUS BLD VENIPUNCTURE: CPT | Performed by: OBSTETRICS & GYNECOLOGY

## 2024-09-05 PROCEDURE — 84702 CHORIONIC GONADOTROPIN TEST: CPT | Performed by: OBSTETRICS & GYNECOLOGY

## 2024-09-20 ENCOUNTER — TELEPHONE (OUTPATIENT)
Dept: OBSTETRICS AND GYNECOLOGY | Facility: CLINIC | Age: 24
End: 2024-09-20

## 2024-09-20 NOTE — TELEPHONE ENCOUNTER
----- Message from Fany Verde sent at 9/20/2024  1:19 PM CDT -----  Contact: Self  Janett Urias  MRN: [id number]  Home Phone      Not on file.  Work Phone      Not on file.  Mobile          [phone]    Patient Care Team:  Chester Phillips PA as PCP - General (General Practice)  Cheryl Flores MD as Consulting Physician (Obstetrics and Gynecology)  Salazar Hernández MD as Obstetrician (Obstetrics)  OB? No  What phone number can you be reached at? [phone]  Message: calling in regards to Clomid and progesterone

## 2024-09-20 NOTE — TELEPHONE ENCOUNTER
Pt was called and she stated she is CD 4 today and decided to start clomid today instead of CD 3. Informed pt when she needs to get progesterone drawn. Pt inquired about when she will be able to get HSG done. Informed pt that it is possible she may have to wait until her next cycle, but will have a nurse contact her to discuss when she returns to clinic on Monday.  Pt voiced understanding.

## 2024-09-23 NOTE — TELEPHONE ENCOUNTER
Attempted to contact patient via telephone, left voicemail for patient to contact office.   RE:  SONDRA

## 2024-10-04 ENCOUNTER — TELEPHONE (OUTPATIENT)
Dept: OBSTETRICS AND GYNECOLOGY | Facility: CLINIC | Age: 24
End: 2024-10-04

## 2024-10-04 NOTE — TELEPHONE ENCOUNTER
Pt was called and she is going to be CD 21 on Monday and desires to get progesterone drawn prior to her appt. Appt scheduled for lab and she voiced understanding.

## 2024-10-04 NOTE — TELEPHONE ENCOUNTER
----- Message from Fany sent at 10/4/2024  9:10 AM CDT -----  Contact: Self  Janett Urias  MRN: [id number]  Home Phone      Not on file.  Work Phone      Not on file.  Mobile          [phone]    Patient Care Team:  Chester Phillips PA as PCP - General (General Practice)  Cheryl Flores MD as Consulting Physician (Obstetrics and Gynecology)  Salazar Hernández MD as Obstetrician (Obstetrics)  OB? No  What phone number can you be reached at? [phone]  Message: has questions about paperwork on Monday

## 2024-10-07 ENCOUNTER — LAB VISIT (OUTPATIENT)
Dept: LAB | Facility: HOSPITAL | Age: 24
End: 2024-10-07
Attending: OBSTETRICS & GYNECOLOGY

## 2024-10-07 ENCOUNTER — PATIENT MESSAGE (OUTPATIENT)
Dept: OBSTETRICS AND GYNECOLOGY | Facility: CLINIC | Age: 24
End: 2024-10-07

## 2024-10-07 DIAGNOSIS — Z31.9 INFERTILITY MANAGEMENT: ICD-10-CM

## 2024-10-07 LAB — PROGEST SERPL-MCNC: 9.9 NG/ML

## 2024-10-07 PROCEDURE — 84144 ASSAY OF PROGESTERONE: CPT | Performed by: OBSTETRICS & GYNECOLOGY

## 2024-10-07 PROCEDURE — 36415 COLL VENOUS BLD VENIPUNCTURE: CPT | Performed by: OBSTETRICS & GYNECOLOGY

## 2024-10-15 ENCOUNTER — PATIENT MESSAGE (OUTPATIENT)
Dept: OBSTETRICS AND GYNECOLOGY | Facility: CLINIC | Age: 24
End: 2024-10-15

## 2024-10-15 DIAGNOSIS — N97.9 INFERTILITY, FEMALE: Primary | ICD-10-CM

## 2024-10-15 DIAGNOSIS — Z01.812 PRE-PROCEDURAL LABORATORY EXAMINATION: ICD-10-CM

## 2024-11-08 ENCOUNTER — PATIENT MESSAGE (OUTPATIENT)
Dept: OBSTETRICS AND GYNECOLOGY | Facility: CLINIC | Age: 24
End: 2024-11-08

## 2024-11-20 ENCOUNTER — PATIENT MESSAGE (OUTPATIENT)
Dept: OBSTETRICS AND GYNECOLOGY | Facility: CLINIC | Age: 24
End: 2024-11-20

## 2024-11-20 DIAGNOSIS — N97.9 INFERTILITY, FEMALE: Primary | ICD-10-CM

## 2024-12-02 ENCOUNTER — PATIENT MESSAGE (OUTPATIENT)
Dept: OBSTETRICS AND GYNECOLOGY | Facility: CLINIC | Age: 24
End: 2024-12-02

## 2024-12-19 ENCOUNTER — TELEPHONE (OUTPATIENT)
Dept: OBSTETRICS AND GYNECOLOGY | Facility: CLINIC | Age: 24
End: 2024-12-19

## 2024-12-19 ENCOUNTER — PATIENT MESSAGE (OUTPATIENT)
Dept: OBSTETRICS AND GYNECOLOGY | Facility: CLINIC | Age: 24
End: 2024-12-19

## 2024-12-19 NOTE — TELEPHONE ENCOUNTER
Pt was called and appt was scheduled. Pt voiced understanding. Pt did not take clomid this past cycle per pt.

## 2024-12-19 NOTE — TELEPHONE ENCOUNTER
----- Message from Med Assistant Pizano sent at 12/19/2024 12:31 PM CST -----  Contact: self  Janett Urias  MRN: [id number]  Home Phone      Not on file.  Work Phone      Not on file.  Mobile          [phone]    Patient Care Team:  Chester Phillips PA as PCP - General (General Practice)  Cheryl Flores MD as Consulting Physician (Obstetrics and Gynecology)  Salazar Hernández MD as Obstetrician (Obstetrics)  OB? No  What phone number can you be reached at? [phone]  Message: Needs to make appt for dx preg.

## 2024-12-20 ENCOUNTER — OFFICE VISIT (OUTPATIENT)
Dept: OBSTETRICS AND GYNECOLOGY | Facility: CLINIC | Age: 24
End: 2024-12-20

## 2024-12-20 ENCOUNTER — LAB VISIT (OUTPATIENT)
Dept: LAB | Facility: HOSPITAL | Age: 24
End: 2024-12-20
Attending: OBSTETRICS & GYNECOLOGY

## 2024-12-20 ENCOUNTER — PATIENT MESSAGE (OUTPATIENT)
Dept: OBSTETRICS AND GYNECOLOGY | Facility: CLINIC | Age: 24
End: 2024-12-20

## 2024-12-20 VITALS
WEIGHT: 247.38 LBS | HEART RATE: 96 BPM | SYSTOLIC BLOOD PRESSURE: 118 MMHG | BODY MASS INDEX: 46.71 KG/M2 | HEIGHT: 61 IN | DIASTOLIC BLOOD PRESSURE: 74 MMHG

## 2024-12-20 DIAGNOSIS — Z12.4 SCREENING FOR CERVICAL CANCER: ICD-10-CM

## 2024-12-20 DIAGNOSIS — R39.15 URINARY URGENCY: ICD-10-CM

## 2024-12-20 DIAGNOSIS — Z11.3 SCREENING EXAMINATION FOR VENEREAL DISEASE: ICD-10-CM

## 2024-12-20 DIAGNOSIS — Z32.01 PREGNANCY EXAMINATION OR TEST, POSITIVE RESULT: Primary | ICD-10-CM

## 2024-12-20 DIAGNOSIS — Z32.01 PREGNANCY EXAMINATION OR TEST, POSITIVE RESULT: ICD-10-CM

## 2024-12-20 LAB
ABO + RH BLD: NORMAL
B-HCG UR QL: POSITIVE
BASOPHILS # BLD AUTO: 0.01 K/UL (ref 0–0.2)
BASOPHILS NFR BLD: 0.2 % (ref 0–1.9)
BLD GP AB SCN CELLS X3 SERPL QL: NORMAL
CTP QC/QA: YES
DIFFERENTIAL METHOD BLD: ABNORMAL
EOSINOPHIL # BLD AUTO: 0 K/UL (ref 0–0.5)
EOSINOPHIL NFR BLD: 0.2 % (ref 0–8)
ERYTHROCYTE [DISTWIDTH] IN BLOOD BY AUTOMATED COUNT: 11.7 % (ref 11.5–14.5)
HBV SURFACE AG SERPL QL IA: NORMAL
HCG INTACT+B SERPL-ACNC: 744 MIU/ML
HCT VFR BLD AUTO: 39.8 % (ref 37–48.5)
HCV AB SERPL QL IA: NORMAL
HGB BLD-MCNC: 14.2 G/DL (ref 12–16)
HIV 1+2 AB+HIV1 P24 AG SERPL QL IA: NORMAL
IMM GRANULOCYTES # BLD AUTO: 0.01 K/UL (ref 0–0.04)
IMM GRANULOCYTES NFR BLD AUTO: 0.2 % (ref 0–0.5)
LYMPHOCYTES # BLD AUTO: 1.6 K/UL (ref 1–4.8)
LYMPHOCYTES NFR BLD: 24.4 % (ref 18–48)
MCH RBC QN AUTO: 32.9 PG (ref 27–31)
MCHC RBC AUTO-ENTMCNC: 35.7 G/DL (ref 32–36)
MCV RBC AUTO: 92 FL (ref 82–98)
MONOCYTES # BLD AUTO: 0.5 K/UL (ref 0.3–1)
MONOCYTES NFR BLD: 7.4 % (ref 4–15)
NEUTROPHILS # BLD AUTO: 4.5 K/UL (ref 1.8–7.7)
NEUTROPHILS NFR BLD: 67.6 % (ref 38–73)
NRBC BLD-RTO: 0 /100 WBC
PLATELET # BLD AUTO: 231 K/UL (ref 150–450)
PMV BLD AUTO: 11 FL (ref 9.2–12.9)
PROGEST SERPL-MCNC: 1.2 NG/ML
RBC # BLD AUTO: 4.31 M/UL (ref 4–5.4)
TREPONEMA PALLIDUM IGG+IGM AB [PRESENCE] IN SERUM OR PLASMA BY IMMUNOASSAY: NONREACTIVE
WBC # BLD AUTO: 6.59 K/UL (ref 3.9–12.7)

## 2024-12-20 PROCEDURE — 87491 CHLMYD TRACH DNA AMP PROBE: CPT | Performed by: OBSTETRICS & GYNECOLOGY

## 2024-12-20 PROCEDURE — 99213 OFFICE O/P EST LOW 20 MIN: CPT | Mod: PBBFAC | Performed by: OBSTETRICS & GYNECOLOGY

## 2024-12-20 PROCEDURE — 85025 COMPLETE CBC W/AUTO DIFF WBC: CPT | Performed by: OBSTETRICS & GYNECOLOGY

## 2024-12-20 PROCEDURE — 99999 PR PBB SHADOW E&M-EST. PATIENT-LVL III: CPT | Mod: PBBFAC,,, | Performed by: OBSTETRICS & GYNECOLOGY

## 2024-12-20 PROCEDURE — 87389 HIV-1 AG W/HIV-1&-2 AB AG IA: CPT | Performed by: OBSTETRICS & GYNECOLOGY

## 2024-12-20 PROCEDURE — 84702 CHORIONIC GONADOTROPIN TEST: CPT | Performed by: OBSTETRICS & GYNECOLOGY

## 2024-12-20 PROCEDURE — 84144 ASSAY OF PROGESTERONE: CPT | Performed by: OBSTETRICS & GYNECOLOGY

## 2024-12-20 PROCEDURE — 86803 HEPATITIS C AB TEST: CPT | Performed by: OBSTETRICS & GYNECOLOGY

## 2024-12-20 PROCEDURE — 87086 URINE CULTURE/COLONY COUNT: CPT | Performed by: OBSTETRICS & GYNECOLOGY

## 2024-12-20 PROCEDURE — 86762 RUBELLA ANTIBODY: CPT | Performed by: OBSTETRICS & GYNECOLOGY

## 2024-12-20 PROCEDURE — 87340 HEPATITIS B SURFACE AG IA: CPT | Performed by: OBSTETRICS & GYNECOLOGY

## 2024-12-20 PROCEDURE — 36415 COLL VENOUS BLD VENIPUNCTURE: CPT | Performed by: OBSTETRICS & GYNECOLOGY

## 2024-12-20 PROCEDURE — 86593 SYPHILIS TEST NON-TREP QUANT: CPT | Performed by: OBSTETRICS & GYNECOLOGY

## 2024-12-20 PROCEDURE — 86850 RBC ANTIBODY SCREEN: CPT | Performed by: OBSTETRICS & GYNECOLOGY

## 2024-12-20 NOTE — PROGRESS NOTES
Subjective:   Patient ID: Janett Urias is a 24 y.o. y.o. female.     Chief Complaint: Missed Menses       History of Present Illness:    Janett presents today complaining of amenorrhea. Patient's last menstrual period was 2024.. Prior menstrual cycles have been regular. She reports fatigue, nausea, and positive home pregnancy test. UPT is positive.       Past Medical History:   Diagnosis Date    Chlamydia      History reviewed. No pertinent surgical history.  Social History     Socioeconomic History    Marital status: Single   Tobacco Use    Smoking status: Never    Smokeless tobacco: Never   Substance and Sexual Activity    Alcohol use: No    Drug use: No    Sexual activity: Yes     Partners: Male     Birth control/protection: None     Comment: single     Family History   Problem Relation Name Age of Onset    Stroke Paternal Grandfather      Hypertension Paternal Grandmother      Stroke Maternal Grandfather      No Known Problems Father      Diabetes Mother      Hypertension Mother      No Known Problems Brother      Diabetes Sister      Breast cancer Neg Hx      Colon cancer Neg Hx      Ovarian cancer Neg Hx       OB History    Para Term  AB Living   2       2     SAB IAB Ectopic Multiple Live Births   2              # Outcome Date GA Lbr Khang/2nd Weight Sex Type Anes PTL Lv   2 SAB 2022 8w2d    SAB      1 SAB 2019 11w2d    SAB            ROS:   Review of Systems   Constitutional:  Negative for chills, diaphoresis, fatigue, fever and unexpected weight change.   HENT:  Negative for congestion, hearing loss, postnasal drip, rhinorrhea, sinus pressure, sinus pain, sore throat and tinnitus.    Eyes:  Negative for pain, discharge and visual disturbance.   Respiratory:  Negative for apnea, cough, shortness of breath and wheezing.    Cardiovascular:  Negative for chest pain, palpitations and leg swelling.   Gastrointestinal:  Negative for abdominal pain, constipation, diarrhea, nausea and  vomiting.   Endocrine: Negative for cold intolerance, heat intolerance, polydipsia, polyphagia and polyuria.   Genitourinary:  Negative for difficulty urinating, dyspareunia, dysuria, enuresis, flank pain, frequency, genital sores, hematuria, menstrual problem, pelvic pain, urgency, vaginal bleeding, vaginal discharge and vaginal pain.   Musculoskeletal:  Negative for arthralgias, back pain, joint swelling, myalgias, neck pain and neck stiffness.   Skin:  Negative for color change, pallor and rash.   Allergic/Immunologic: Negative for environmental allergies, food allergies and immunocompromised state.   Neurological:  Negative for dizziness, weakness, light-headedness, numbness and headaches.   Hematological:  Negative for adenopathy. Does not bruise/bleed easily.   Psychiatric/Behavioral:  Negative for agitation and confusion. The patient is not nervous/anxious.            Objective:   Vital Signs:  Vitals:    12/20/24 1009   BP: 118/74   Pulse: 96       Physical Exam:  General:  alert, cooperative, no distress   Head: Normocephalic, atraumatic   Neck: Supple, Normal ROM   Respiratory: Normal effort   Neuro/Psych: Alert and oriented, appropriate mood and affect   Abdomen:   Soft, NT   Pelvis: External genitalia: normal general appearance  Urinary system: urethral meatus normal, bladder nontender  Vaginal: normal mucosa without prolapse or lesions, presence of blood, spotting  Cervix: normal appearance  Uterus: normal single, nontender, retroverted  Adnexa: normal size, nontender bilaterally       Assessment:      1. Pregnancy examination or test, positive result    2. Urinary urgency    3. Screening examination for venereal disease    4. Screening for cervical cancer          Plan:        Pregnancy examination or test, positive result  -     POCT urine pregnancy  -     Type & Screen - Ob Profile; Future; Expected date: 12/20/2024  -     CBC Auto Differential; Future; Expected date: 12/20/2024  -     Progesterone;  Future; Expected date: 12/20/2024  -     HCG, Quantitative; Future; Expected date: 12/20/2024  -     Hepatitis B Surface Antigen; Future; Expected date: 12/20/2024  -     Rubella Antibody, IgG; Future; Expected date: 12/20/2024  -     Treponema Pallidium Antibodies IgG, IgM; Future; Expected date: 12/20/2024  -     US OB/GYN Procedure ([facility]) - Extended List; Future; Expected date: 01/03/2025  -     HIV 1/2 Ag/Ab (4th Gen); Future; Expected date: 12/20/2024  -     Hepatitis C Antibody; Future; Expected date: 12/20/2024    Urinary urgency  -     Urine culture    Screening examination for venereal disease  -     C. trachomatis/N. gonorrhoeae by AMP DNA [facility]; Cervix    Screening for cervical cancer  -     Liquid-Based Pap Smear, Screening        1. Pap today  2. Prenatal labs ordered and GC/CT performed.  3. Schedule Ultrasound  4. Follow up in 4 weeks after ultrasound.    Patient was counseled today on proper weight gain based on the [place] [facility]'s recommendations based on her pre-pregnancy weight. Discussed foods to avoid in pregnancy (i.e. sushi, fish that are high in mercury, deli meat, and unpasteurized cheeses). Discussed prenatal vitamin options (i.e. stool softener, DHA). She was also counseled on safe, healthy behavior as well as medications safe in pregnancy.

## 2024-12-21 ENCOUNTER — PATIENT MESSAGE (OUTPATIENT)
Dept: OBSTETRICS AND GYNECOLOGY | Facility: CLINIC | Age: 24
End: 2024-12-21

## 2024-12-21 DIAGNOSIS — O20.9 VAGINAL BLEEDING AFFECTING EARLY PREGNANCY: Primary | ICD-10-CM

## 2024-12-21 LAB
BACTERIA UR CULT: NORMAL
BACTERIA UR CULT: NORMAL

## 2024-12-21 RX ORDER — PROGESTERONE 200 MG/1
200 CAPSULE ORAL NIGHTLY
Qty: 20 CAPSULE | Refills: 1 | Status: SHIPPED | OUTPATIENT
Start: 2024-12-21 | End: 2025-12-21

## 2024-12-22 ENCOUNTER — LAB VISIT (OUTPATIENT)
Dept: LAB | Facility: HOSPITAL | Age: 24
End: 2024-12-22
Attending: OBSTETRICS & GYNECOLOGY

## 2024-12-22 ENCOUNTER — PATIENT MESSAGE (OUTPATIENT)
Dept: OBSTETRICS AND GYNECOLOGY | Facility: CLINIC | Age: 24
End: 2024-12-22

## 2024-12-22 DIAGNOSIS — O20.9 VAGINAL BLEEDING AFFECTING EARLY PREGNANCY: ICD-10-CM

## 2024-12-22 LAB
HCG INTACT+B SERPL-ACNC: 1004 MIU/ML
PROGEST SERPL-MCNC: 1.1 NG/ML

## 2024-12-22 PROCEDURE — 36415 COLL VENOUS BLD VENIPUNCTURE: CPT | Performed by: OBSTETRICS & GYNECOLOGY

## 2024-12-22 PROCEDURE — 84702 CHORIONIC GONADOTROPIN TEST: CPT | Performed by: OBSTETRICS & GYNECOLOGY

## 2024-12-22 PROCEDURE — 84144 ASSAY OF PROGESTERONE: CPT | Performed by: OBSTETRICS & GYNECOLOGY

## 2024-12-23 DIAGNOSIS — O20.9 VAGINAL BLEEDING AFFECTING EARLY PREGNANCY: Primary | ICD-10-CM

## 2024-12-23 LAB
RUBV IGG SER-ACNC: 46.1 IU/ML
RUBV IGG SER-IMP: REACTIVE

## 2024-12-24 ENCOUNTER — TELEPHONE (OUTPATIENT)
Dept: OBSTETRICS AND GYNECOLOGY | Facility: CLINIC | Age: 24
End: 2024-12-24

## 2024-12-24 ENCOUNTER — LAB VISIT (OUTPATIENT)
Dept: LAB | Facility: HOSPITAL | Age: 24
End: 2024-12-24
Attending: OBSTETRICS & GYNECOLOGY

## 2024-12-24 DIAGNOSIS — O20.9 VAGINAL BLEEDING AFFECTING EARLY PREGNANCY: ICD-10-CM

## 2024-12-24 LAB — HCG INTACT+B SERPL-ACNC: 1175 MIU/ML

## 2024-12-24 PROCEDURE — 84702 CHORIONIC GONADOTROPIN TEST: CPT | Performed by: OBSTETRICS & GYNECOLOGY

## 2024-12-24 PROCEDURE — 36415 COLL VENOUS BLD VENIPUNCTURE: CPT | Performed by: OBSTETRICS & GYNECOLOGY

## 2024-12-24 NOTE — TELEPHONE ENCOUNTER
Pt was called and informed that Dr. Flores would like for her to have an ultrasound this week. Pt informed to come to clinic for ultrasound on Thursday 12/26 @ 4 pm. Pt desires to know if she can wait to discontinue her progesterone until after her ultrasound results. Pt made aware that I will ask Dr. Flores and get back with her. Pt voiced understanding.

## 2024-12-24 NOTE — TELEPHONE ENCOUNTER
Pt was called and informed that Dr. Flores wants her to stop her progesterone now. Pt agreed to stop now and she voiced understanding.

## 2024-12-26 ENCOUNTER — LAB VISIT (OUTPATIENT)
Dept: LAB | Facility: HOSPITAL | Age: 24
End: 2024-12-26
Attending: OBSTETRICS & GYNECOLOGY

## 2024-12-26 ENCOUNTER — PROCEDURE VISIT (OUTPATIENT)
Dept: MATERNAL FETAL MEDICINE | Facility: CLINIC | Age: 24
End: 2024-12-26

## 2024-12-26 DIAGNOSIS — O20.9 VAGINAL BLEEDING AFFECTING EARLY PREGNANCY: ICD-10-CM

## 2024-12-26 DIAGNOSIS — O20.9 VAGINAL BLEEDING AFFECTING EARLY PREGNANCY: Primary | ICD-10-CM

## 2024-12-26 DIAGNOSIS — O02.81 INAPPROPRIATE CHANGE IN QUANTITATIVE HCG IN EARLY PREGNANCY: ICD-10-CM

## 2024-12-26 DIAGNOSIS — Z32.01 PREGNANCY EXAMINATION OR TEST, POSITIVE RESULT: ICD-10-CM

## 2024-12-26 DIAGNOSIS — Z36.89 ENCOUNTER TO ESTABLISH GESTATIONAL AGE USING ULTRASOUND: Primary | ICD-10-CM

## 2024-12-26 LAB — HCG INTACT+B SERPL-ACNC: 1212 MIU/ML

## 2024-12-26 PROCEDURE — 84702 CHORIONIC GONADOTROPIN TEST: CPT | Performed by: OBSTETRICS & GYNECOLOGY

## 2024-12-26 PROCEDURE — 36415 COLL VENOUS BLD VENIPUNCTURE: CPT | Performed by: OBSTETRICS & GYNECOLOGY

## 2024-12-26 PROCEDURE — 76801 OB US < 14 WKS SINGLE FETUS: CPT | Mod: PBBFAC | Performed by: STUDENT IN AN ORGANIZED HEALTH CARE EDUCATION/TRAINING PROGRAM

## 2024-12-27 ENCOUNTER — PATIENT MESSAGE (OUTPATIENT)
Dept: OBSTETRICS AND GYNECOLOGY | Facility: CLINIC | Age: 24
End: 2024-12-27

## 2024-12-30 ENCOUNTER — LAB VISIT (OUTPATIENT)
Dept: LAB | Facility: HOSPITAL | Age: 24
End: 2024-12-30
Attending: OBSTETRICS & GYNECOLOGY

## 2024-12-30 ENCOUNTER — PATIENT MESSAGE (OUTPATIENT)
Dept: OBSTETRICS AND GYNECOLOGY | Facility: CLINIC | Age: 24
End: 2024-12-30

## 2024-12-30 DIAGNOSIS — O02.81 INAPPROPRIATE CHANGE IN QUANTITATIVE HCG IN EARLY PREGNANCY: ICD-10-CM

## 2024-12-30 DIAGNOSIS — O20.9 VAGINAL BLEEDING AFFECTING EARLY PREGNANCY: ICD-10-CM

## 2024-12-30 LAB
ALBUMIN SERPL BCP-MCNC: 4 G/DL (ref 3.5–5.2)
ALP SERPL-CCNC: 61 U/L (ref 40–150)
ALT SERPL W/O P-5'-P-CCNC: 20 U/L (ref 10–44)
ANION GAP SERPL CALC-SCNC: 7 MMOL/L (ref 8–16)
AST SERPL-CCNC: 21 U/L (ref 10–40)
BASOPHILS # BLD AUTO: 0.01 K/UL (ref 0–0.2)
BASOPHILS NFR BLD: 0.2 % (ref 0–1.9)
BILIRUB SERPL-MCNC: 0.6 MG/DL (ref 0.1–1)
BUN SERPL-MCNC: 11 MG/DL (ref 6–20)
CALCIUM SERPL-MCNC: 9.6 MG/DL (ref 8.7–10.5)
CHLORIDE SERPL-SCNC: 105 MMOL/L (ref 95–110)
CO2 SERPL-SCNC: 26 MMOL/L (ref 23–29)
CREAT SERPL-MCNC: 0.8 MG/DL (ref 0.5–1.4)
DIFFERENTIAL METHOD BLD: ABNORMAL
EOSINOPHIL # BLD AUTO: 0 K/UL (ref 0–0.5)
EOSINOPHIL NFR BLD: 0.2 % (ref 0–8)
ERYTHROCYTE [DISTWIDTH] IN BLOOD BY AUTOMATED COUNT: 11.7 % (ref 11.5–14.5)
EST. GFR  (NO RACE VARIABLE): >60 ML/MIN/1.73 M^2
GLUCOSE SERPL-MCNC: 84 MG/DL (ref 70–110)
HCG INTACT+B SERPL-ACNC: 1179 MIU/ML
HCT VFR BLD AUTO: 40.3 % (ref 37–48.5)
HGB BLD-MCNC: 14.2 G/DL (ref 12–16)
IMM GRANULOCYTES # BLD AUTO: 0.01 K/UL (ref 0–0.04)
IMM GRANULOCYTES NFR BLD AUTO: 0.2 % (ref 0–0.5)
LYMPHOCYTES # BLD AUTO: 1.7 K/UL (ref 1–4.8)
LYMPHOCYTES NFR BLD: 32 % (ref 18–48)
MCH RBC QN AUTO: 32.9 PG (ref 27–31)
MCHC RBC AUTO-ENTMCNC: 35.2 G/DL (ref 32–36)
MCV RBC AUTO: 94 FL (ref 82–98)
MONOCYTES # BLD AUTO: 0.5 K/UL (ref 0.3–1)
MONOCYTES NFR BLD: 8.8 % (ref 4–15)
NEUTROPHILS # BLD AUTO: 3.1 K/UL (ref 1.8–7.7)
NEUTROPHILS NFR BLD: 58.6 % (ref 38–73)
NRBC BLD-RTO: 0 /100 WBC
PLATELET # BLD AUTO: 203 K/UL (ref 150–450)
PMV BLD AUTO: 10.7 FL (ref 9.2–12.9)
POTASSIUM SERPL-SCNC: 4.4 MMOL/L (ref 3.5–5.1)
PROT SERPL-MCNC: 7.4 G/DL (ref 6–8.4)
RBC # BLD AUTO: 4.31 M/UL (ref 4–5.4)
SODIUM SERPL-SCNC: 138 MMOL/L (ref 136–145)
WBC # BLD AUTO: 5.35 K/UL (ref 3.9–12.7)

## 2024-12-30 PROCEDURE — 84702 CHORIONIC GONADOTROPIN TEST: CPT | Performed by: OBSTETRICS & GYNECOLOGY

## 2024-12-30 PROCEDURE — 80053 COMPREHEN METABOLIC PANEL: CPT | Performed by: OBSTETRICS & GYNECOLOGY

## 2024-12-30 PROCEDURE — 36415 COLL VENOUS BLD VENIPUNCTURE: CPT | Performed by: OBSTETRICS & GYNECOLOGY

## 2024-12-30 PROCEDURE — 85025 COMPLETE CBC W/AUTO DIFF WBC: CPT | Performed by: OBSTETRICS & GYNECOLOGY

## 2024-12-31 NOTE — TELEPHONE ENCOUNTER
Janett called and results discussed.  Hcg now trending down.  No bleeding and remains asymptomatic at this time.  Repeat hcg on Friday to confirm still down trending.   Also discussed plans for RPL work up after miscarriage.

## 2025-01-03 ENCOUNTER — LAB VISIT (OUTPATIENT)
Dept: LAB | Facility: HOSPITAL | Age: 25
End: 2025-01-03
Attending: OBSTETRICS & GYNECOLOGY

## 2025-01-03 ENCOUNTER — PATIENT MESSAGE (OUTPATIENT)
Dept: OBSTETRICS AND GYNECOLOGY | Facility: CLINIC | Age: 25
End: 2025-01-03

## 2025-01-03 DIAGNOSIS — O20.9 VAGINAL BLEEDING AFFECTING EARLY PREGNANCY: Primary | ICD-10-CM

## 2025-01-03 DIAGNOSIS — O20.9 VAGINAL BLEEDING AFFECTING EARLY PREGNANCY: ICD-10-CM

## 2025-01-03 LAB — HCG INTACT+B SERPL-ACNC: 1013 MIU/ML

## 2025-01-03 PROCEDURE — 84702 CHORIONIC GONADOTROPIN TEST: CPT | Performed by: OBSTETRICS & GYNECOLOGY

## 2025-01-03 PROCEDURE — 36415 COLL VENOUS BLD VENIPUNCTURE: CPT | Performed by: OBSTETRICS & GYNECOLOGY

## 2025-01-10 ENCOUNTER — LAB VISIT (OUTPATIENT)
Dept: LAB | Facility: HOSPITAL | Age: 25
End: 2025-01-10
Attending: OBSTETRICS & GYNECOLOGY

## 2025-01-10 DIAGNOSIS — O20.9 VAGINAL BLEEDING AFFECTING EARLY PREGNANCY: ICD-10-CM

## 2025-01-10 LAB — HCG INTACT+B SERPL-ACNC: 105 MIU/ML

## 2025-01-10 PROCEDURE — 36415 COLL VENOUS BLD VENIPUNCTURE: CPT | Performed by: OBSTETRICS & GYNECOLOGY

## 2025-01-10 PROCEDURE — 84702 CHORIONIC GONADOTROPIN TEST: CPT | Performed by: OBSTETRICS & GYNECOLOGY

## 2025-01-13 ENCOUNTER — PATIENT MESSAGE (OUTPATIENT)
Dept: OBSTETRICS AND GYNECOLOGY | Facility: CLINIC | Age: 25
End: 2025-01-13

## 2025-01-16 ENCOUNTER — LAB VISIT (OUTPATIENT)
Dept: LAB | Facility: HOSPITAL | Age: 25
End: 2025-01-16
Attending: OBSTETRICS & GYNECOLOGY

## 2025-01-16 DIAGNOSIS — O20.9 VAGINAL BLEEDING AFFECTING EARLY PREGNANCY: ICD-10-CM

## 2025-01-16 LAB — HCG INTACT+B SERPL-ACNC: 6.7 MIU/ML

## 2025-01-16 PROCEDURE — 84702 CHORIONIC GONADOTROPIN TEST: CPT | Performed by: OBSTETRICS & GYNECOLOGY

## 2025-01-16 PROCEDURE — 36415 COLL VENOUS BLD VENIPUNCTURE: CPT | Performed by: OBSTETRICS & GYNECOLOGY

## 2025-01-17 ENCOUNTER — PATIENT MESSAGE (OUTPATIENT)
Dept: OBSTETRICS AND GYNECOLOGY | Facility: CLINIC | Age: 25
End: 2025-01-17

## 2025-03-10 ENCOUNTER — PATIENT MESSAGE (OUTPATIENT)
Dept: OBSTETRICS AND GYNECOLOGY | Facility: CLINIC | Age: 25
End: 2025-03-10
Payer: MEDICAID

## 2025-03-10 NOTE — TELEPHONE ENCOUNTER
Per message from January, pt will be needing labs drawn about 8 weeks after (approx 3/14/25). Pt is CD 2 today. Please advise. Thank you.

## 2025-03-11 ENCOUNTER — TELEPHONE (OUTPATIENT)
Dept: OBSTETRICS AND GYNECOLOGY | Facility: CLINIC | Age: 25
End: 2025-03-11
Payer: MEDICAID

## 2025-03-11 NOTE — TELEPHONE ENCOUNTER
Pt was called and informed that message was sent to Dr. Flores yesterday and am waiting for her response. Pt voiced understanding.

## 2025-03-11 NOTE — TELEPHONE ENCOUNTER
----- Message from Med Assistant Pizano sent at 3/11/2025 11:29 AM CDT -----  Contact: self  Janett UriasMRN: [id number] Phone      Not on file.Work Phone      Not on file.Mobile          [phone] Care Team:Chester Phillips PA as PCP - General (General Practice)Cheryl Flores MD as Consulting Physician (Obstetrics and Gynecology)Salazar Hernández MD as Obstetrician (Obstetrics)OB? NoWhat phone number can you be reached at? [phone]: Would like to speak nurse regarding getting lab work done since miscarriage.

## 2025-04-01 ENCOUNTER — OFFICE VISIT (OUTPATIENT)
Dept: OBSTETRICS AND GYNECOLOGY | Facility: CLINIC | Age: 25
End: 2025-04-01
Payer: MEDICAID

## 2025-04-01 VITALS
HEIGHT: 61 IN | HEART RATE: 88 BPM | SYSTOLIC BLOOD PRESSURE: 118 MMHG | BODY MASS INDEX: 46.03 KG/M2 | WEIGHT: 243.81 LBS | DIASTOLIC BLOOD PRESSURE: 76 MMHG

## 2025-04-01 DIAGNOSIS — Z87.42 HISTORY OF INFERTILITY: ICD-10-CM

## 2025-04-01 DIAGNOSIS — N96 HISTORY OF RECURRENT MISCARRIAGES: Primary | ICD-10-CM

## 2025-04-01 PROCEDURE — 3078F DIAST BP <80 MM HG: CPT | Mod: CPTII,,, | Performed by: OBSTETRICS & GYNECOLOGY

## 2025-04-01 PROCEDURE — 1160F RVW MEDS BY RX/DR IN RCRD: CPT | Mod: CPTII,,, | Performed by: OBSTETRICS & GYNECOLOGY

## 2025-04-01 PROCEDURE — 3074F SYST BP LT 130 MM HG: CPT | Mod: CPTII,,, | Performed by: OBSTETRICS & GYNECOLOGY

## 2025-04-01 PROCEDURE — 99214 OFFICE O/P EST MOD 30 MIN: CPT | Mod: S$PBB,,, | Performed by: OBSTETRICS & GYNECOLOGY

## 2025-04-01 PROCEDURE — 99999 PR PBB SHADOW E&M-EST. PATIENT-LVL III: CPT | Mod: PBBFAC,,, | Performed by: OBSTETRICS & GYNECOLOGY

## 2025-04-01 PROCEDURE — 1159F MED LIST DOCD IN RCRD: CPT | Mod: CPTII,,, | Performed by: OBSTETRICS & GYNECOLOGY

## 2025-04-01 PROCEDURE — 3008F BODY MASS INDEX DOCD: CPT | Mod: CPTII,,, | Performed by: OBSTETRICS & GYNECOLOGY

## 2025-04-01 PROCEDURE — 99213 OFFICE O/P EST LOW 20 MIN: CPT | Mod: PBBFAC | Performed by: OBSTETRICS & GYNECOLOGY

## 2025-04-01 NOTE — PROGRESS NOTES
Subjective:       Patient ID: Janett Urias is a 24 y.o. female.    Chief Complaint:  Consult (Fertility and f/u from sab 3/10/25)      History of Present Illness  Pt is a  who is here today for follow up.  Patient has a h/o infertility and recently was pregnant and unfortunately had an SAB last month.  This was patient's 3rd miscarriage.   She would like to have RPL blood work done for evaluation.    GYN & OB History  Patient's last menstrual period was 2025.   Date of Last Pap: 2024    OB History    Para Term  AB Living   3    3    SAB IAB Ectopic Multiple Live Births   3          # Outcome Date GA Lbr Khang/2nd Weight Sex Type Anes PTL Lv   3 SAB 03/10/25 8w0d    SAB      2 SAB 2022 8w2d    SAB      1 SAB 2019 11w2d    SAB          Review of Systems  Review of Systems   Constitutional:  Negative for chills, diaphoresis, fatigue, fever and unexpected weight change.   HENT:  Negative for congestion, hearing loss, postnasal drip, rhinorrhea, sinus pressure, sinus pain, sore throat and tinnitus.    Eyes:  Negative for pain, discharge and visual disturbance.   Respiratory:  Negative for apnea, cough, shortness of breath and wheezing.    Cardiovascular:  Negative for chest pain, palpitations and leg swelling.   Gastrointestinal:  Negative for abdominal pain, constipation, diarrhea, nausea and vomiting.   Endocrine: Negative for cold intolerance, heat intolerance, polydipsia, polyphagia and polyuria.   Genitourinary:  Negative for difficulty urinating, dyspareunia, dysuria, enuresis, flank pain, frequency, genital sores, hematuria, menstrual problem, pelvic pain, urgency, vaginal bleeding, vaginal discharge and vaginal pain.   Musculoskeletal:  Negative for arthralgias, back pain, joint swelling, myalgias, neck pain and neck stiffness.   Skin:  Negative for color change, pallor and rash.   Allergic/Immunologic: Negative for environmental allergies, food allergies and  immunocompromised state.   Neurological:  Negative for dizziness, weakness, light-headedness, numbness and headaches.   Hematological:  Negative for adenopathy. Does not bruise/bleed easily.   Psychiatric/Behavioral:  Negative for agitation and confusion. The patient is not nervous/anxious.            Objective:    Physical Exam:   Constitutional: She is oriented to person, place, and time. She appears well-developed and well-nourished. No distress.    HENT:   Head: Normocephalic and atraumatic.    Eyes: Conjunctivae and EOM are normal.      Pulmonary/Chest: Effort normal. No respiratory distress.                  Musculoskeletal: Normal range of motion.       Neurological: She is alert and oriented to person, place, and time.    Skin: Skin is warm and dry.    Psychiatric: She has a normal mood and affect. Her behavior is normal. Judgment and thought content normal.          Assessment:        1. History of recurrent miscarriages    2. History of infertility                Plan:      Janett was seen today for consult.    Diagnoses and all orders for this visit:    History of recurrent miscarriages  -     TSH; Future  -     Hemoglobin A1C; Future  -     PROLACTIN; Future  -     ANTIPHOSPHOLIPID AB (ANTICARDIOLIPIN); Future  -     Beta-2 Glycoprotein Abs (IgA, IgG, IgM); Future  -     DRVVT; Future    History of infertility  -     TSH; Future  -     Hemoglobin A1C; Future  -     PROLACTIN; Future  -     ANTIPHOSPHOLIPID AB (ANTICARDIOLIPIN); Future  -     Beta-2 Glycoprotein Abs (IgA, IgG, IgM); Future  -     DRVVT; Future

## 2025-04-17 ENCOUNTER — RESULTS FOLLOW-UP (OUTPATIENT)
Dept: OBSTETRICS AND GYNECOLOGY | Facility: CLINIC | Age: 25
End: 2025-04-17
Payer: MEDICAID

## 2025-04-17 ENCOUNTER — LAB VISIT (OUTPATIENT)
Dept: LAB | Facility: HOSPITAL | Age: 25
End: 2025-04-17
Attending: OBSTETRICS & GYNECOLOGY
Payer: MEDICAID

## 2025-04-17 ENCOUNTER — PATIENT MESSAGE (OUTPATIENT)
Dept: OBSTETRICS AND GYNECOLOGY | Facility: CLINIC | Age: 25
End: 2025-04-17
Payer: MEDICAID

## 2025-04-17 DIAGNOSIS — N96 HISTORY OF RECURRENT MISCARRIAGES: ICD-10-CM

## 2025-04-17 DIAGNOSIS — Z32.00 ENCOUNTER FOR PREGNANCY TEST, RESULT UNKNOWN: ICD-10-CM

## 2025-04-17 DIAGNOSIS — Z87.42 HISTORY OF INFERTILITY: ICD-10-CM

## 2025-04-17 DIAGNOSIS — Z32.00 ENCOUNTER FOR PREGNANCY TEST, RESULT UNKNOWN: Primary | ICD-10-CM

## 2025-04-17 LAB
HCG INTACT+B SERPL-ACNC: <1.2 MIU/ML
TSH SERPL-ACNC: 1.69 UIU/ML (ref 0.4–4)

## 2025-04-17 PROCEDURE — 84146 ASSAY OF PROLACTIN: CPT

## 2025-04-17 PROCEDURE — 83036 HEMOGLOBIN GLYCOSYLATED A1C: CPT

## 2025-04-17 PROCEDURE — 85613 RUSSELL VIPER VENOM DILUTED: CPT

## 2025-04-17 PROCEDURE — 86147 CARDIOLIPIN ANTIBODY EA IG: CPT | Mod: 59

## 2025-04-17 PROCEDURE — 84443 ASSAY THYROID STIM HORMONE: CPT

## 2025-04-17 PROCEDURE — 84702 CHORIONIC GONADOTROPIN TEST: CPT

## 2025-04-17 PROCEDURE — 86146 BETA-2 GLYCOPROTEIN ANTIBODY: CPT

## 2025-04-17 PROCEDURE — 36415 COLL VENOUS BLD VENIPUNCTURE: CPT

## 2025-04-18 LAB
EAG (OHS): 85 MG/DL (ref 68–131)
HBA1C MFR BLD: 4.6 % (ref 4–5.6)
PROLACTIN SERPL IA-MCNC: 10 NG/ML (ref 5.2–26.5)

## 2025-04-21 LAB
W CARDIOLIPIN IGG AB: 0.6 GPL
W CARDIOLIPIN IGM AB: 6.1 MPL

## 2025-04-22 LAB
W BETA-2 GLYCOPROTEIN 1 IGA AB: 2 U/ML
W BETA-2 GLYCOPROTEIN 1 IGG AB: <0.8 U/ML
W BETA-2 GLYCOPROTEIN 1 IGM AB: 3.7 U/ML

## 2025-04-23 ENCOUNTER — PATIENT MESSAGE (OUTPATIENT)
Dept: OBSTETRICS AND GYNECOLOGY | Facility: CLINIC | Age: 25
End: 2025-04-23
Payer: MEDICAID

## 2025-05-05 ENCOUNTER — RESULTS FOLLOW-UP (OUTPATIENT)
Dept: OBSTETRICS AND GYNECOLOGY | Facility: CLINIC | Age: 25
End: 2025-05-05

## 2025-05-14 ENCOUNTER — PATIENT MESSAGE (OUTPATIENT)
Dept: OBSTETRICS AND GYNECOLOGY | Facility: CLINIC | Age: 25
End: 2025-05-14
Payer: MEDICAID

## 2025-05-14 DIAGNOSIS — Z32.00 ENCOUNTER FOR PREGNANCY TEST, RESULT UNKNOWN: Primary | ICD-10-CM

## 2025-05-14 NOTE — TELEPHONE ENCOUNTER
Pt had faint positive UPT and then a negative UPT a few hours later. Pt missed menses this month and requested HCG. Orders placed.

## 2025-07-16 ENCOUNTER — OFFICE VISIT (OUTPATIENT)
Dept: OBSTETRICS AND GYNECOLOGY | Facility: CLINIC | Age: 25
End: 2025-07-16
Payer: MEDICAID

## 2025-07-16 VITALS
HEIGHT: 61 IN | DIASTOLIC BLOOD PRESSURE: 84 MMHG | BODY MASS INDEX: 46.48 KG/M2 | SYSTOLIC BLOOD PRESSURE: 120 MMHG | HEART RATE: 71 BPM | WEIGHT: 246.19 LBS

## 2025-07-16 DIAGNOSIS — Z11.3 SCREENING EXAMINATION FOR VENEREAL DISEASE: ICD-10-CM

## 2025-07-16 DIAGNOSIS — Z71.3 ENCOUNTER FOR WEIGHT LOSS COUNSELING: ICD-10-CM

## 2025-07-16 DIAGNOSIS — N93.8 DUB (DYSFUNCTIONAL UTERINE BLEEDING): ICD-10-CM

## 2025-07-16 DIAGNOSIS — N76.0 ACUTE VAGINITIS: ICD-10-CM

## 2025-07-16 DIAGNOSIS — Z31.9 INFERTILITY MANAGEMENT: Primary | ICD-10-CM

## 2025-07-16 PROCEDURE — 99213 OFFICE O/P EST LOW 20 MIN: CPT | Mod: PBBFAC | Performed by: OBSTETRICS & GYNECOLOGY

## 2025-07-16 PROCEDURE — 99999 PR PBB SHADOW E&M-EST. PATIENT-LVL III: CPT | Mod: PBBFAC,,, | Performed by: OBSTETRICS & GYNECOLOGY

## 2025-07-16 RX ORDER — CLOMIPHENE CITRATE 50 MG/1
50 TABLET ORAL DAILY
Qty: 5 TABLET | Refills: 0 | Status: SHIPPED | OUTPATIENT
Start: 2025-07-16 | End: 2025-07-21

## 2025-07-16 NOTE — PROGRESS NOTES
Subjective:       Patient ID: Janett Urias is a 24 y.o. female.    Chief Complaint:  STD CHECK and Consult (fertility)      History of Present Illness  HPI  Pt presents requesting STD testing.  She denies symptoms, but has noted more clear discharge than normal - more frequently.    She also would like to review her menstrual diary.  She has h/o infertility and has tried a cycle of Clomid, but was unable to afford recently.  She would like to do Clomid again with next cycle.  Cycles have been ranging 4.5 to 6 weeks in length..  She also is interested in another HSG since last test was in  and an ultrasound evaluation.      She also c/o trouble with weight loss.    GYN & OB History  Patient's last menstrual period was 2025.   Date of Last Pap: 2024    OB History    Para Term  AB Living   3    3    SAB IAB Ectopic Multiple Live Births   3          # Outcome Date GA Lbr Khang/2nd Weight Sex Type Anes PTL Lv   3 SAB 03/10/25 8w0d    SAB      2 2022 8w2d    SAB      1 SAB 2019 11w2d    SAB          Review of Systems  Review of Systems   Constitutional:  Negative for chills, diaphoresis, fatigue, fever and unexpected weight change.   HENT:  Negative for congestion, hearing loss, postnasal drip, rhinorrhea, sinus pressure, sinus pain, sore throat and tinnitus.    Eyes:  Negative for pain, discharge and visual disturbance.   Respiratory:  Negative for apnea, cough, shortness of breath and wheezing.    Cardiovascular:  Negative for chest pain, palpitations and leg swelling.   Gastrointestinal:  Negative for abdominal pain, constipation, diarrhea, nausea and vomiting.   Endocrine: Negative for cold intolerance, heat intolerance, polydipsia, polyphagia and polyuria.   Genitourinary:  Positive for menstrual problem. Negative for difficulty urinating, dyspareunia, dysuria, enuresis, flank pain, frequency, genital sores, hematuria, pelvic pain, urgency, vaginal bleeding, vaginal discharge  and vaginal pain.   Musculoskeletal:  Negative for arthralgias, back pain, joint swelling, myalgias, neck pain and neck stiffness.   Skin:  Negative for color change, pallor and rash.   Allergic/Immunologic: Negative for environmental allergies, food allergies and immunocompromised state.   Neurological:  Negative for dizziness, weakness, light-headedness, numbness and headaches.   Hematological:  Negative for adenopathy. Does not bruise/bleed easily.   Psychiatric/Behavioral:  Negative for agitation and confusion. The patient is not nervous/anxious.            Objective:    Physical Exam:   Constitutional: She is oriented to person, place, and time. She appears well-developed and well-nourished. No distress.    HENT:   Head: Normocephalic and atraumatic.    Eyes: Conjunctivae and EOM are normal.      Pulmonary/Chest: Effort normal. No respiratory distress.                  Musculoskeletal: Normal range of motion.       Neurological: She is alert and oriented to person, place, and time.    Skin: Skin is warm and dry.    Psychiatric: She has a normal mood and affect. Her behavior is normal. Judgment and thought content normal.          Assessment:        1. Infertility management    2. Screening examination for venereal disease    3. Acute vaginitis    4. DUB (dysfunctional uterine bleeding)    5. Encounter for weight loss counseling               Plan:      Janett was seen today for std check and consult.    Diagnoses and all orders for this visit:    Infertility management  -     US Pelvis Comp with Transvag NON-OB (xpd; Future  -     clomiPHENE (CLOMID) 50 mg tablet; Take 1 tablet (50 mg total) by mouth once daily. for 5 days    Screening examination for venereal disease  -     C. trachomatis/N. gonorrhoeae by AMP DNA [facility]; Cervix    Acute vaginitis  -     Vaginosis Screen by DNA Probe    DUB (dysfunctional uterine bleeding)  -     US Pelvis Comp with Transvag NON-OB (xpd; Future  -     clomiPHENE (CLOMID) 50 mg  tablet; Take 1 tablet (50 mg total) by mouth once daily. for 5 days    Encounter for weight loss counseling      Patient to call on day 1 of menstrual cycle to schedule HSG.

## 2025-07-24 ENCOUNTER — PATIENT MESSAGE (OUTPATIENT)
Dept: OBSTETRICS AND GYNECOLOGY | Facility: CLINIC | Age: 25
End: 2025-07-24
Payer: MEDICAID

## 2025-07-26 ENCOUNTER — OFFICE VISIT (OUTPATIENT)
Dept: URGENT CARE | Facility: CLINIC | Age: 25
End: 2025-07-26
Payer: MEDICAID

## 2025-07-26 ENCOUNTER — NURSE TRIAGE (OUTPATIENT)
Dept: ADMINISTRATIVE | Facility: CLINIC | Age: 25
End: 2025-07-26
Payer: MEDICAID

## 2025-07-26 DIAGNOSIS — Z32.01 POSITIVE URINE PREGNANCY TEST: Primary | ICD-10-CM

## 2025-07-26 PROCEDURE — 98004 SYNCH AUDIO-VIDEO EST SF 10: CPT | Mod: 95,,, | Performed by: NURSE PRACTITIONER

## 2025-07-26 NOTE — TELEPHONE ENCOUNTER
Pt states that she had 3 miscarriages. Pt states that she is positive for pregnancy via home test today. Pt asking if she should go to ED to get started on Progesterone. Advise to be seen when office is open within 3 days per protocol. VU. ODVV offered and accepted. Encounter routed to provider for f/u call and appt per protocol.   Reason for Disposition   Prescription request for new medicine (not a refill)    Protocols used: Medication Refill and Renewal Call-A-AH

## 2025-07-27 NOTE — PROGRESS NOTES
Subjective:      Patient ID: Janett Urias is a 24 y.o. female.    Vitals:  vitals were not taken for this visit.     Chief Complaint: positive at home test      Visit Type: TELE AUDIOVISUAL - This visit was conducted virtually based on  subjective information and limited objective exam    Present with the patient at the time of consultation: TELEMED PRESENT WITH PATIENT: None  [place]  Two patient identifiers used to verify patient- saying out date of birth and full name.       Past Medical History:   Diagnosis Date    Chlamydia      History reviewed. No pertinent surgical history.  Review of patient's allergies indicates:  No Known Allergies  Medications Ordered Prior to Encounter[1]  Family History   Problem Relation Name Age of Onset    Stroke Paternal Grandfather      Hypertension Paternal Grandmother      Stroke Maternal Grandfather      No Known Problems Father      Diabetes Mother      Hypertension Mother      No Known Problems Brother      Diabetes Sister      Breast cancer Neg Hx      Colon cancer Neg Hx      Ovarian cancer Neg Hx             Ohs Peq Odvv Intake    7/26/2025  7:17 PM CDT - Filed by Patient   What is your current physical address in the event of a medical emergency? [address] [place]   Are you able to take your vital signs? No   Please attach any relevant images or files    Is your employer contracted with [facility]? No         25 yo female with c/o having three miscarriages in past year and she states she knows that one of the causes was low progesterone. She states she took two at home tests previous days and they were negative.   She states she did have a positive test today. She is concerned that she should be on progesterone . She is currently being treat with oral flagyl for bv.       ROS     Objective:   The physical exam was conducted virtually.    AAO x 3 ; no acute distress noted; appearance normal; mood and behavior normal; thought  process normal  Head- normocephalic  Nose- appears normal, no discharge or erythema  Eyes- pupils appear normal in size, no drainage, no erythema  Ears- normal appearing; no discharge, no erythema  Mouth- appears normal  Oropharynx- no erythema, lesions  Lungs- breathing at a normal rate, no acute distress noted  Heart- no reports of tachycardia, palpitations, chest pain  Abdomen- non distended, non tender reported by patient  Skin- warm and dry, no erythema or edema noted by patient or visualized  Psych- as above; no si/hi      Assessment:     1. Positive urine pregnancy test        Plan:     Advised to send message on portal to ob gyn. She will need a confirmatory pregnancy test. She would be just over 3 weeks per patient.     Thank you for choosing [facility]!    Our goal in the [facility] is to always provide outstanding medical care. You may receive a survey by mail or e-mail in the next week regarding your experience today. We would greatly appreciate you completing and returning the survey. Your feedback provides us with a way to recognize our staff who provide very good care, and it helps us learn how to improve when your experience was below our aspiration of excellence.         We appreciate you trusting us with your medical care. We hope you feel better soon. We will be happy to take care of you for all of your future medical needs.    You must understand that you've received an Urgent Care treatment only and that you may be released before all your medical problems are known or treated. You, the patient, will arrange for follow up care as instructed.    Follow up with your PCP or specialty clinic as directed in the next 1-2 weeks if not improved or as needed.  You can call [phone] to schedule an appointment with the appropriate provider.    If your condition worsens we recommend that you receive another evaluation in person, with your primary care provider,  urgent care or at the emergency room immediately or contact your primary medical clinics after hours call service to discuss your concerns.         Positive urine pregnancy test                             [1]   Current Outpatient Medications on File Prior to Visit   Medication Sig Dispense Refill    metroNIDAZOLE (FLAGYL) 500 MG tablet Take 1 tablet (500 mg total) by mouth every 12 (twelve) hours. for 7 days 14 tablet 0    valACYclovir (VALTREX) 1000 MG tablet Take 1 tablet (1,000 mg total) by mouth once daily. for 5 days 30 tablet 0     No current facility-administered medications on file prior to visit.

## 2025-07-28 ENCOUNTER — PATIENT MESSAGE (OUTPATIENT)
Dept: OBSTETRICS AND GYNECOLOGY | Facility: CLINIC | Age: 25
End: 2025-07-28
Payer: MEDICAID

## 2025-07-28 ENCOUNTER — LAB VISIT (OUTPATIENT)
Dept: LAB | Facility: HOSPITAL | Age: 25
End: 2025-07-28
Attending: OBSTETRICS & GYNECOLOGY
Payer: MEDICAID

## 2025-07-28 DIAGNOSIS — Z32.00 ENCOUNTER FOR PREGNANCY TEST, RESULT UNKNOWN: ICD-10-CM

## 2025-07-28 DIAGNOSIS — Z32.00 ENCOUNTER FOR PREGNANCY TEST, RESULT UNKNOWN: Primary | ICD-10-CM

## 2025-07-28 LAB — HCG INTACT+B SERPL-ACNC: <2.4 MIU/ML

## 2025-07-28 PROCEDURE — 84702 CHORIONIC GONADOTROPIN TEST: CPT

## 2025-07-28 PROCEDURE — 36415 COLL VENOUS BLD VENIPUNCTURE: CPT

## 2025-07-30 ENCOUNTER — HOSPITAL ENCOUNTER (OUTPATIENT)
Dept: RADIOLOGY | Facility: HOSPITAL | Age: 25
Discharge: HOME | End: 2025-07-30
Attending: OBSTETRICS & GYNECOLOGY
Payer: MEDICAID

## 2025-07-30 DIAGNOSIS — N93.8 DUB (DYSFUNCTIONAL UTERINE BLEEDING): ICD-10-CM

## 2025-07-30 DIAGNOSIS — Z31.9 INFERTILITY MANAGEMENT: ICD-10-CM

## 2025-07-30 PROCEDURE — 76856 US EXAM PELVIC COMPLETE: CPT | Mod: 26,,, | Performed by: RADIOLOGY

## 2025-07-30 PROCEDURE — 76830 TRANSVAGINAL US NON-OB: CPT | Mod: TC

## 2025-07-30 PROCEDURE — 76830 TRANSVAGINAL US NON-OB: CPT | Mod: 26,,, | Performed by: RADIOLOGY

## 2025-07-31 ENCOUNTER — PATIENT MESSAGE (OUTPATIENT)
Dept: OBSTETRICS AND GYNECOLOGY | Facility: CLINIC | Age: 25
End: 2025-07-31
Payer: MEDICAID